# Patient Record
Sex: FEMALE | Race: WHITE | URBAN - NONMETROPOLITAN AREA
[De-identification: names, ages, dates, MRNs, and addresses within clinical notes are randomized per-mention and may not be internally consistent; named-entity substitution may affect disease eponyms.]

---

## 2023-04-05 ENCOUNTER — HOSPITAL ENCOUNTER (EMERGENCY)
Age: 58
Discharge: HOME | End: 2023-04-05
Attending: EMERGENCY MEDICINE
Payer: MEDICARE

## 2023-04-05 VITALS
DIASTOLIC BLOOD PRESSURE: 85 MMHG | TEMPERATURE: 97 F | OXYGEN SATURATION: 99 % | SYSTOLIC BLOOD PRESSURE: 136 MMHG | WEIGHT: 100 LBS | RESPIRATION RATE: 16 BRPM | HEART RATE: 80 BPM

## 2023-04-05 DIAGNOSIS — R19.7 DIARRHEA, UNSPECIFIED TYPE: Primary | ICD-10-CM

## 2023-04-05 LAB
ABSOLUTE EOS #: 0.06 K/UL (ref 0–0.44)
ABSOLUTE IMMATURE GRANULOCYTE: 0.03 K/UL (ref 0–0.3)
ABSOLUTE LYMPH #: 2.64 K/UL (ref 1.1–3.7)
ABSOLUTE MONO #: 0.49 K/UL (ref 0.1–1.2)
ALBUMIN SERPL-MCNC: 4.3 G/DL (ref 3.5–5.2)
ALBUMIN/GLOBULIN RATIO: 1.2 (ref 1–2.5)
ALP SERPL-CCNC: 77 U/L (ref 35–104)
ALT SERPL-CCNC: 11 U/L (ref 5–33)
ANION GAP SERPL CALCULATED.3IONS-SCNC: 10 MMOL/L (ref 9–17)
AST SERPL-CCNC: 16 U/L
BASOPHILS # BLD: 1 % (ref 0–2)
BASOPHILS ABSOLUTE: 0.05 K/UL (ref 0–0.2)
BILIRUB SERPL-MCNC: 0.4 MG/DL (ref 0.3–1.2)
BUN SERPL-MCNC: 10 MG/DL (ref 6–20)
BUN/CREAT BLD: 15 (ref 9–20)
CALCIUM SERPL-MCNC: 9.4 MG/DL (ref 8.6–10.4)
CHLORIDE SERPL-SCNC: 105 MMOL/L (ref 98–107)
CO2 SERPL-SCNC: 25 MMOL/L (ref 20–31)
CREAT SERPL-MCNC: 0.65 MG/DL (ref 0.5–0.9)
EOSINOPHILS RELATIVE PERCENT: 1 % (ref 1–4)
GFR SERPL CREATININE-BSD FRML MDRD: >60 ML/MIN/1.73M2
GLUCOSE SERPL-MCNC: 91 MG/DL (ref 70–99)
HCT VFR BLD AUTO: 44.5 % (ref 36.3–47.1)
HGB BLD-MCNC: 14.9 G/DL (ref 11.9–15.1)
IMMATURE GRANULOCYTES: 0 %
LIPASE SERPL-CCNC: 41 U/L (ref 13–60)
LYMPHOCYTES # BLD: 28 % (ref 24–43)
MCH RBC QN AUTO: 30 PG (ref 25.2–33.5)
MCHC RBC AUTO-ENTMCNC: 33.5 G/DL (ref 28.4–34.8)
MCV RBC AUTO: 89.5 FL (ref 82.6–102.9)
MONOCYTES # BLD: 5 % (ref 3–12)
NRBC AUTOMATED: 0 PER 100 WBC
PDW BLD-RTO: 13.4 % (ref 11.8–14.4)
PLATELET # BLD AUTO: 301 K/UL (ref 138–453)
PMV BLD AUTO: 9.9 FL (ref 8.1–13.5)
POTASSIUM SERPL-SCNC: 3.7 MMOL/L (ref 3.7–5.3)
PROT SERPL-MCNC: 7.8 G/DL (ref 6.4–8.3)
RBC # BLD: 4.97 M/UL (ref 3.95–5.11)
SEG NEUTROPHILS: 65 % (ref 36–65)
SEGMENTED NEUTROPHILS ABSOLUTE COUNT: 6.01 K/UL (ref 1.5–8.1)
SODIUM SERPL-SCNC: 140 MMOL/L (ref 135–144)
WBC # BLD AUTO: 9.3 K/UL (ref 3.5–11.3)

## 2023-04-05 PROCEDURE — 85025 COMPLETE CBC W/AUTO DIFF WBC: CPT

## 2023-04-05 PROCEDURE — 36415 COLL VENOUS BLD VENIPUNCTURE: CPT

## 2023-04-05 PROCEDURE — 80053 COMPREHEN METABOLIC PANEL: CPT

## 2023-04-05 PROCEDURE — 83690 ASSAY OF LIPASE: CPT

## 2023-04-05 ASSESSMENT — PAIN - FUNCTIONAL ASSESSMENT: PAIN_FUNCTIONAL_ASSESSMENT: NONE - DENIES PAIN

## 2023-04-05 ASSESSMENT — ENCOUNTER SYMPTOMS
SORE THROAT: 0
ABDOMINAL DISTENTION: 0
DIARRHEA: 1
SHORTNESS OF BREATH: 0
BACK PAIN: 0

## 2023-04-05 NOTE — ED PROVIDER NOTES
Pulmonary:      Effort: Pulmonary effort is normal.      Breath sounds: Normal breath sounds. Abdominal:      General: Abdomen is flat. Bowel sounds are normal.      Palpations: Abdomen is soft. Musculoskeletal:         General: Normal range of motion. Skin:     General: Skin is warm and dry. Capillary Refill: Capillary refill takes less than 2 seconds. Neurological:      General: No focal deficit present. Mental Status: She is alert and oriented to person, place, and time. Psychiatric:         Mood and Affect: Mood normal.       DIAGNOSTIC RESULTS     EKG: All EKG's are interpreted by the Emergency Department Physician who either signs or Co-signs this chart in the absence of a cardiologist.      RADIOLOGY:   Non-plain film images such as CT, Ultrasound and MRI are read by the radiologist. Plain radiographic images are visualized and preliminarily interpreted by the emergency physician with the below findings:      Interpretation per the Radiologist below, if available at the time of this note:    No orders to display         ED BEDSIDE ULTRASOUND:   Performed by ED Physician - none    LABS:  Labs Reviewed   CBC WITH AUTO DIFFERENTIAL   COMPREHENSIVE METABOLIC PANEL   LIPASE   URINALYSIS WITH MICROSCOPIC       All other labs were within normal range or not returned as of this dictation. EMERGENCY DEPARTMENT COURSE and DIFFERENTIAL DIAGNOSIS/MDM:   Vitals:    Vitals:    04/05/23 1024 04/05/23 1033   BP: (!) 152/113 136/85   Pulse: 72 80   Resp: 16 16   Temp: 98.1 °F (36.7 °C) 97 °F (36.1 °C)   TempSrc: Oral Tympanic   SpO2: 100% 99%   Weight:  100 lb (45.4 kg)           REASSESSMENT        Reassured patient that at this time she does not have any symptoms and her vital signs are completely stable. She does not have any abdominal pain. She has only had 5 episodes of encouraged her to follow-up with her primary care doctor so she can get a referral to GI doctor in case she does have IBS.

## 2023-04-05 NOTE — DISCHARGE INSTRUCTIONS
Please make sure to continue drinking plenty of fluids. Follow-up with your family doctor so you can get a referral to gastroenterology for possible outpatient colonoscopy if your symptoms continue. Otherwise you can take Imodium to help with the diarrhea as needed. Watch for signs of blood in the stool or if the stools are black in color to return to the emergency department. If you have any concerns or questions regarding your care today, please discuss with your nurse or physician prior to leaving the emergency department. Thank you for allowing us to take care of you at [facility]..  In the next few days you may receive a survey by mail or e-mail asking about the care you received during this visit. Please complete this if you are able, as this feedback helps us provide the best care possible.

## 2023-05-10 ENCOUNTER — HOSPITAL ENCOUNTER (EMERGENCY)
Age: 58
Discharge: HOME | End: 2023-05-10
Payer: MEDICARE

## 2023-05-10 ENCOUNTER — APPOINTMENT (OUTPATIENT)
Dept: CT IMAGING | Age: 58
End: 2023-05-10
Payer: MEDICARE

## 2023-05-10 VITALS
WEIGHT: 99 LBS | HEART RATE: 93 BPM | OXYGEN SATURATION: 98 % | SYSTOLIC BLOOD PRESSURE: 107 MMHG | DIASTOLIC BLOOD PRESSURE: 63 MMHG | TEMPERATURE: 98.8 F | RESPIRATION RATE: 16 BRPM

## 2023-05-10 DIAGNOSIS — N34.2 INFECTIVE URETHRITIS: ICD-10-CM

## 2023-05-10 DIAGNOSIS — R10.9 ABDOMINAL PAIN, UNSPECIFIED ABDOMINAL LOCATION: Primary | ICD-10-CM

## 2023-05-10 LAB
ABSOLUTE EOS #: 0.03 K/UL (ref 0–0.44)
ABSOLUTE IMMATURE GRANULOCYTE: <0.03 K/UL (ref 0–0.3)
ABSOLUTE LYMPH #: 2.89 K/UL (ref 1.1–3.7)
ABSOLUTE MONO #: 0.77 K/UL (ref 0.1–1.2)
ALBUMIN SERPL-MCNC: 4.5 G/DL (ref 3.5–5.2)
ALBUMIN/GLOBULIN RATIO: 1.4 (ref 1–2.5)
ALP SERPL-CCNC: 81 U/L (ref 35–104)
ALT SERPL-CCNC: 12 U/L (ref 5–33)
ANION GAP SERPL CALCULATED.3IONS-SCNC: 12 MMOL/L (ref 9–17)
AST SERPL-CCNC: 15 U/L
BACTERIA: ABNORMAL
BASOPHILS # BLD: 1 % (ref 0–2)
BASOPHILS ABSOLUTE: 0.04 K/UL (ref 0–0.2)
BILIRUB SERPL-MCNC: 0.2 MG/DL (ref 0.3–1.2)
BILIRUBIN URINE: NEGATIVE
BUN SERPL-MCNC: 13 MG/DL (ref 6–20)
BUN/CREAT BLD: 20 (ref 9–20)
CALCIUM SERPL-MCNC: 10 MG/DL (ref 8.6–10.4)
CHLORIDE SERPL-SCNC: 103 MMOL/L (ref 98–107)
CO2 SERPL-SCNC: 27 MMOL/L (ref 20–31)
COLOR: YELLOW
CREAT SERPL-MCNC: 0.65 MG/DL (ref 0.5–0.9)
EOSINOPHILS RELATIVE PERCENT: 0 % (ref 1–4)
EPITHELIAL CELLS UA: ABNORMAL /HPF (ref 0–25)
GFR SERPL CREATININE-BSD FRML MDRD: >60 ML/MIN/1.73M2
GLUCOSE SERPL-MCNC: 107 MG/DL (ref 70–99)
GLUCOSE UR STRIP.AUTO-MCNC: NEGATIVE MG/DL
HCT VFR BLD AUTO: 46.8 % (ref 36.3–47.1)
HGB BLD-MCNC: 15.1 G/DL (ref 11.9–15.1)
IMMATURE GRANULOCYTES: 0 %
KETONES UR STRIP.AUTO-MCNC: NEGATIVE MG/DL
LACTATE PLASV-SCNC: 1.7 MMOL/L (ref 0.5–2.2)
LEUKOCYTE ESTERASE UR QL STRIP.AUTO: ABNORMAL
LIPASE SERPL-CCNC: 44 U/L (ref 13–60)
LYMPHOCYTES # BLD: 33 % (ref 24–43)
MCH RBC QN AUTO: 29.5 PG (ref 25.2–33.5)
MCHC RBC AUTO-ENTMCNC: 32.3 G/DL (ref 28.4–34.8)
MCV RBC AUTO: 91.4 FL (ref 82.6–102.9)
MONOCYTES # BLD: 9 % (ref 3–12)
NITRITE UR QL STRIP.AUTO: POSITIVE
NRBC AUTOMATED: 0 PER 100 WBC
PDW BLD-RTO: 13.5 % (ref 11.8–14.4)
PLATELET # BLD AUTO: 326 K/UL (ref 138–453)
PMV BLD AUTO: 9.9 FL (ref 8.1–13.5)
POTASSIUM SERPL-SCNC: 3.8 MMOL/L (ref 3.7–5.3)
PROT SERPL-MCNC: 7.7 G/DL (ref 6.4–8.3)
PROT UR STRIP.AUTO-MCNC: 6 MG/DL (ref 5–9)
PROT UR STRIP.AUTO-MCNC: NEGATIVE MG/DL
RBC # BLD: 5.12 M/UL (ref 3.95–5.11)
RBC CLUMPS #/AREA URNS AUTO: ABNORMAL /HPF (ref 0–2)
SEG NEUTROPHILS: 57 % (ref 36–65)
SEGMENTED NEUTROPHILS ABSOLUTE COUNT: 4.93 K/UL (ref 1.5–8.1)
SODIUM SERPL-SCNC: 142 MMOL/L (ref 135–144)
SPECIFIC GRAVITY UA: 1.01 (ref 1.01–1.02)
TURBIDITY: ABNORMAL
URINE HGB: ABNORMAL
UROBILINOGEN, URINE: NORMAL
WBC # BLD AUTO: 8.7 K/UL (ref 3.5–11.3)
WBC UA: ABNORMAL /HPF (ref 0–5)

## 2023-05-10 PROCEDURE — 6360000004 HC RX CONTRAST MEDICATION: Performed by: PHYSICIAN ASSISTANT

## 2023-05-10 PROCEDURE — 99285 EMERGENCY DEPT VISIT HI MDM: CPT

## 2023-05-10 PROCEDURE — 83690 ASSAY OF LIPASE: CPT

## 2023-05-10 PROCEDURE — 86403 PARTICLE AGGLUT ANTBDY SCRN: CPT

## 2023-05-10 PROCEDURE — 87086 URINE CULTURE/COLONY COUNT: CPT

## 2023-05-10 PROCEDURE — 96365 THER/PROPH/DIAG IV INF INIT: CPT

## 2023-05-10 PROCEDURE — 87077 CULTURE AEROBIC IDENTIFY: CPT

## 2023-05-10 PROCEDURE — 36415 COLL VENOUS BLD VENIPUNCTURE: CPT

## 2023-05-10 PROCEDURE — 74177 CT ABD & PELVIS W/CONTRAST: CPT

## 2023-05-10 PROCEDURE — 87186 SC STD MICRODIL/AGAR DIL: CPT

## 2023-05-10 PROCEDURE — 6360000002 HC RX W HCPCS: Performed by: PHYSICIAN ASSISTANT

## 2023-05-10 PROCEDURE — 80053 COMPREHEN METABOLIC PANEL: CPT

## 2023-05-10 PROCEDURE — 83605 ASSAY OF LACTIC ACID: CPT

## 2023-05-10 PROCEDURE — 81001 URINALYSIS AUTO W/SCOPE: CPT

## 2023-05-10 PROCEDURE — 2580000003 HC RX 258: Performed by: PHYSICIAN ASSISTANT

## 2023-05-10 PROCEDURE — 85025 COMPLETE CBC W/AUTO DIFF WBC: CPT

## 2023-05-10 RX ORDER — 0.9 % SODIUM CHLORIDE 0.9 %
1000 INTRAVENOUS SOLUTION INTRAVENOUS ONCE
Status: COMPLETED | OUTPATIENT
Start: 2023-05-10 | End: 2023-05-10

## 2023-05-10 RX ORDER — CEPHALEXIN 500 MG/1
500 CAPSULE ORAL 4 TIMES DAILY
Qty: 40 CAPSULE | Refills: 0 | Status: SHIPPED | OUTPATIENT
Start: 2023-05-10 | End: 2023-05-20

## 2023-05-10 RX ADMIN — CEFTRIAXONE 1000 MG: 1 INJECTION, POWDER, FOR SOLUTION INTRAMUSCULAR; INTRAVENOUS at 18:01

## 2023-05-10 RX ADMIN — SODIUM CHLORIDE 1000 ML: 9 INJECTION, SOLUTION INTRAVENOUS at 17:57

## 2023-05-10 RX ADMIN — IOPAMIDOL 75 ML: 755 INJECTION, SOLUTION INTRAVENOUS at 16:37

## 2023-05-10 ASSESSMENT — PAIN - FUNCTIONAL ASSESSMENT: PAIN_FUNCTIONAL_ASSESSMENT: NONE - DENIES PAIN

## 2023-05-10 ASSESSMENT — ENCOUNTER SYMPTOMS: RESPIRATORY NEGATIVE: 1

## 2023-05-10 NOTE — DISCHARGE INSTRUCTIONS
Follow-up with colonoscopy appointment as scheduled. Also, follow-up with primary care doctor 7 to 10 days for reevaluation. Start antibiotics tomorrow as directed as you have already been given a dose here in the emergency department. Promptly return to emergency department for new, changing, worsening of symptoms or other concerns.

## 2023-05-10 NOTE — ED PROVIDER NOTES
[facility] ED  EMERGENCY DEPARTMENT ENCOUNTER      Pt Name: Tho Arndt  MRN: [id number]  [place] 1965  Date of evaluation: 5/10/2023  Provider: Mary Kay Casanova PA-C    CHIEF COMPLAINT       Chief Complaint   Patient presents with    Abdominal Pain     Intermittent over the last couple of months, also reports different colored stools, has been seen for same complaint in the ED and by PCP, has appointment with GI doctor on          HISTORY OF PRESENT ILLNESS   (Location/Symptom, Timing/Onset, Context/Setting, Quality, Duration, Modifying Factors, Severity)  Note limiting factors. Tho Arndt is a 63 y.o. female who presents to the emergency department for evaluation of intermittent abdominal pain since March, scheduled for colonoscopy 16 May at [facility]. Patient reports last 2 days she has noticed \"dark\" stools. Patient denies daily aspirin or anticoagulative therapy. Patient reports intermittent diarrhea and denies recent antibiotic use. Patient denies history of fever chest pain acute shortness of breath dizziness nausea vomiting nasreen blood from below bleeding hemorrhoids or urinary symptoms. No other symptoms noted, patient has no additional complaints at present. HPI    Nursing Notes were reviewed. REVIEW OF SYSTEMS    (2-9 systems for level 4, 10 or more for level 5)     Review of Systems   Constitutional:  Positive for unexpected weight change. See hpi   Respiratory: Negative. Cardiovascular: Negative. Gastrointestinal:         See hpi   Genitourinary: Negative. Musculoskeletal: Negative. Skin: Negative. Psychiatric/Behavioral: Negative. Except as noted above the remainder of the review of systems was reviewed and negative.        PAST MEDICAL HISTORY     Past Medical History:   Diagnosis Date    CP (cerebral palsy) ([facility] [address]          SURGICAL HISTORY       Past Surgical History:   Procedure Laterality Date     SECTION      LEG

## 2023-05-12 LAB
MICROORGANISM SPEC CULT: ABNORMAL
MICROORGANISM SPEC CULT: ABNORMAL
SPECIMEN DESCRIPTION: ABNORMAL

## 2023-08-07 ENCOUNTER — HOSPITAL ENCOUNTER (OUTPATIENT)
Age: 58
Setting detail: SPECIMEN
Discharge: HOME | End: 2023-08-07

## 2023-08-09 LAB — H PYLORI AG STL QL IA: NEGATIVE

## 2023-09-07 ENCOUNTER — TELEPHONE (OUTPATIENT)
Dept: GASTROENTEROLOGY | Age: 58
End: 2023-09-07

## 2023-10-03 ENCOUNTER — HOSPITAL ENCOUNTER (OUTPATIENT)
Age: 58
Setting detail: SPECIMEN
Discharge: HOME | End: 2023-10-03
Payer: MEDICARE

## 2023-10-03 ENCOUNTER — OFFICE VISIT (OUTPATIENT)
Dept: UROLOGY | Age: 58
End: 2023-10-03

## 2023-10-03 VITALS — WEIGHT: 90 LBS | TEMPERATURE: 98 F | SYSTOLIC BLOOD PRESSURE: 110 MMHG | DIASTOLIC BLOOD PRESSURE: 78 MMHG

## 2023-10-03 DIAGNOSIS — N39.46 MIXED INCONTINENCE: Primary | ICD-10-CM

## 2023-10-03 DIAGNOSIS — R35.0 FREQUENCY OF URINATION: ICD-10-CM

## 2023-10-03 DIAGNOSIS — Z86.19 HISTORY OF HELICOBACTER PYLORI INFECTION: ICD-10-CM

## 2023-10-03 DIAGNOSIS — N39.46 MIXED INCONTINENCE: ICD-10-CM

## 2023-10-03 DIAGNOSIS — R63.4 WEIGHT LOSS: ICD-10-CM

## 2023-10-03 DIAGNOSIS — Z01.419 WELL WOMAN EXAM: ICD-10-CM

## 2023-10-03 LAB
BACTERIA URNS QL MICRO: ABNORMAL
BILIRUB UR QL STRIP: NEGATIVE
CLARITY UR: CLEAR
COLOR UR: YELLOW
EPI CELLS #/AREA URNS HPF: ABNORMAL /HPF (ref 0–25)
GLUCOSE UR STRIP-MCNC: NEGATIVE MG/DL
HGB UR QL STRIP.AUTO: ABNORMAL
KETONES UR STRIP-MCNC: NEGATIVE MG/DL
LEUKOCYTE ESTERASE UR QL STRIP: ABNORMAL
NITRITE UR QL STRIP: POSITIVE
PH UR STRIP: 6 [PH] (ref 5–9)
PROT UR STRIP-MCNC: NEGATIVE MG/DL
RBC #/AREA URNS HPF: ABNORMAL /HPF (ref 0–2)
SP GR UR STRIP: 1.01 (ref 1.01–1.02)
UROBILINOGEN UR STRIP-ACNC: NORMAL EU/DL (ref 0–1)
WBC #/AREA URNS HPF: ABNORMAL /HPF (ref 0–5)

## 2023-10-03 PROCEDURE — 81001 URINALYSIS AUTO W/SCOPE: CPT

## 2023-10-03 PROCEDURE — 87086 URINE CULTURE/COLONY COUNT: CPT

## 2023-10-03 PROCEDURE — 86403 PARTICLE AGGLUT ANTBDY SCRN: CPT

## 2023-10-03 RX ORDER — OMEPRAZOLE 20 MG/1
CAPSULE, DELAYED RELEASE ORAL
COMMUNITY
Start: 2023-09-05

## 2023-10-03 NOTE — PATIENT INSTRUCTIONS
Drink 64-80 ounces of water every day    Yogurt with live and active cultures (job's yogurt)    Take probiotic daily, make sure it has billions of bacteria    SURVEY:    You may be receiving a survey from [employer] regarding your visit today. Please complete the survey to enable us to provide the highest quality of care to you and your family. If you cannot score us a very good on any question, please call the office to discuss how we could have made your experience a very good one. Thank you.

## 2023-10-03 NOTE — PROGRESS NOTES
HPI:        Patient is a 63 y.o. female in no acute distress. She is alert and oriented to person, place, and time. Cerebral palsy    New patient referral for incontinence. She developed urinary continence approximately 4 months ago after a bout of H. pylori. She also complains of urinary frequency and urgency. Prior to getting h.pylori she did have some urgency and rare UUI. She has fecal urgency, but denies fecal incontinence. She has nocturia 1-2 times per night, but she is already wet by the time she gets there. She is using 4 briefs per day with 6 pads per day. PVR is 140ml. She is having a soft daily bowel movement. She does pass urine and stool at the same time every time she goes to the bathroom. She denies any dysuria or gross hematuria. She only drinks water. In 2023 urine culture was positive for Klebsiella and group beta strep. She also had a CT in May. This film was independently reviewed and showed no evidence of  abnormalities. She has never seen urology in the past.      Past Medical History:   Diagnosis Date    CP (cerebral palsy) ([address])      Past Surgical History:   Procedure Laterality Date     SECTION      LEG SURGERY       Outpatient Encounter Medications as of 10/3/2023   Medication Sig Dispense Refill    omeprazole (PRILOSEC) 20 MG delayed release capsule TAKE 1 CAPSULE BY MOUTH ONCE DAILY FOR EIGHT WEEKS      acetaminophen (TYLENOL) 500 MG tablet Take 2 tablets by mouth every 6 hours as needed for Pain       No facility-administered encounter medications on file as of 10/3/2023. Current Outpatient Medications on File Prior to Visit   Medication Sig Dispense Refill    omeprazole (PRILOSEC) 20 MG delayed release capsule TAKE 1 CAPSULE BY MOUTH ONCE DAILY FOR EIGHT WEEKS      acetaminophen (TYLENOL) 500 MG tablet Take 2 tablets by mouth every 6 hours as needed for Pain       No current facility-administered medications on file prior to visit.      Patient has

## 2023-10-04 LAB
MICROORGANISM SPEC CULT: ABNORMAL
SPECIMEN DESCRIPTION: ABNORMAL

## 2023-10-05 ENCOUNTER — TELEPHONE (OUTPATIENT)
Dept: UROLOGY | Age: 58
End: 2023-10-05

## 2023-10-05 RX ORDER — CEPHALEXIN 500 MG/1
500 CAPSULE ORAL 3 TIMES DAILY
Qty: 21 CAPSULE | Refills: 0 | Status: SHIPPED | OUTPATIENT
Start: 2023-10-05 | End: 2023-10-12

## 2023-10-05 ASSESSMENT — ENCOUNTER SYMPTOMS
COLOR CHANGE: 0
COUGH: 0
EYE REDNESS: 0
NAUSEA: 0
VOMITING: 0
SHORTNESS OF BREATH: 0
CONSTIPATION: 0
APNEA: 0
ABDOMINAL PAIN: 0
BACK PAIN: 0
WHEEZING: 0

## 2023-10-05 NOTE — TELEPHONE ENCOUNTER
Patient informed of results and verbalizes understanding. Patient uses the [facility] on [address] [facility].

## 2023-10-05 NOTE — TELEPHONE ENCOUNTER
Urine culture is positive for infection. What local pharmacy does she use? Take keflex TID. Take this antibiotic until gone. Take this with food and eat yogurt once per day to prevent GI upset. If you develop nausea, vomiting, or fevers call the office or go to the ER. If your urinary symptoms do not improve once completing the antibiotics call our office.

## 2023-10-11 NOTE — PROGRESS NOTES
management.       Return in about 1 month (around 11/12/2023) for gyn u/s - no transvaginal .       Electronically Signed by TOMER Wyman CNM

## 2023-10-12 ENCOUNTER — OFFICE VISIT (OUTPATIENT)
Dept: OBGYN | Age: 58
End: 2023-10-12

## 2023-10-12 ENCOUNTER — HOSPITAL ENCOUNTER (OUTPATIENT)
Age: 58
Discharge: HOME | End: 2023-10-12
Payer: MEDICARE

## 2023-10-12 VITALS
BODY MASS INDEX: 18.14 KG/M2 | SYSTOLIC BLOOD PRESSURE: 110 MMHG | WEIGHT: 90 LBS | HEIGHT: 59 IN | DIASTOLIC BLOOD PRESSURE: 72 MMHG

## 2023-10-12 DIAGNOSIS — N94.10 DYSPAREUNIA, FEMALE: ICD-10-CM

## 2023-10-12 DIAGNOSIS — Z01.419 WELL WOMAN EXAM: Primary | ICD-10-CM

## 2023-10-12 DIAGNOSIS — Z12.4 CERVICAL CANCER SCREENING: ICD-10-CM

## 2023-10-12 DIAGNOSIS — R10.2 PELVIC PAIN: ICD-10-CM

## 2023-10-12 DIAGNOSIS — Z01.419 WELL WOMAN EXAM: ICD-10-CM

## 2023-10-12 DIAGNOSIS — Z11.3 ROUTINE SCREENING FOR STI (SEXUALLY TRANSMITTED INFECTION): ICD-10-CM

## 2023-10-12 DIAGNOSIS — R32 BOWEL AND BLADDER INCONTINENCE: ICD-10-CM

## 2023-10-12 DIAGNOSIS — N95.2 VAGINAL ATROPHY: ICD-10-CM

## 2023-10-12 DIAGNOSIS — R15.9 BOWEL AND BLADDER INCONTINENCE: ICD-10-CM

## 2023-10-12 LAB
CANDIDA SPECIES: NEGATIVE
GARDNERELLA VAGINALIS: NEGATIVE
HCV AB SERPL QL IA: NONREACTIVE
HIV 1+2 AB+HIV1 P24 AG SERPL QL IA: NONREACTIVE
SOURCE: NORMAL
T PALLIDUM AB SER QL IA: NONREACTIVE
TRICHOMONAS: NEGATIVE

## 2023-10-12 PROCEDURE — 87591 N.GONORRHOEAE DNA AMP PROB: CPT

## 2023-10-12 PROCEDURE — 87660 TRICHOMONAS VAGIN DIR PROBE: CPT

## 2023-10-12 PROCEDURE — 87480 CANDIDA DNA DIR PROBE: CPT

## 2023-10-12 PROCEDURE — 87491 CHLMYD TRACH DNA AMP PROBE: CPT

## 2023-10-12 PROCEDURE — 87510 GARDNER VAG DNA DIR PROBE: CPT

## 2023-10-12 PROCEDURE — 36415 COLL VENOUS BLD VENIPUNCTURE: CPT

## 2023-10-12 PROCEDURE — G0145 SCR C/V CYTO,THINLAYER,RESCR: HCPCS

## 2023-10-12 PROCEDURE — 86780 TREPONEMA PALLIDUM: CPT

## 2023-10-12 PROCEDURE — 86803 HEPATITIS C AB TEST: CPT

## 2023-10-12 PROCEDURE — 87624 HPV HI-RISK TYP POOLED RSLT: CPT

## 2023-10-12 PROCEDURE — 87389 HIV-1 AG W/HIV-1&-2 AB AG IA: CPT

## 2023-10-12 RX ORDER — ESTRADIOL 0.1 MG/G
2 CREAM VAGINAL DAILY
Qty: 42.5 G | Refills: 1 | Status: SHIPPED | OUTPATIENT
Start: 2023-10-12

## 2023-10-12 SDOH — ECONOMIC STABILITY: FOOD INSECURITY: WITHIN THE PAST 12 MONTHS, YOU WORRIED THAT YOUR FOOD WOULD RUN OUT BEFORE YOU GOT MONEY TO BUY MORE.: NEVER TRUE

## 2023-10-12 SDOH — ECONOMIC STABILITY: FOOD INSECURITY: WITHIN THE PAST 12 MONTHS, THE FOOD YOU BOUGHT JUST DIDN'T LAST AND YOU DIDN'T HAVE MONEY TO GET MORE.: NEVER TRUE

## 2023-10-12 SDOH — ECONOMIC STABILITY: HOUSING INSECURITY
IN THE LAST 12 MONTHS, WAS THERE A TIME WHEN YOU DID NOT HAVE A STEADY PLACE TO SLEEP OR SLEPT IN A SHELTER (INCLUDING NOW)?: NO

## 2023-10-12 SDOH — ECONOMIC STABILITY: INCOME INSECURITY: HOW HARD IS IT FOR YOU TO PAY FOR THE VERY BASICS LIKE FOOD, HOUSING, MEDICAL CARE, AND HEATING?: NOT HARD AT ALL

## 2023-10-12 ASSESSMENT — PATIENT HEALTH QUESTIONNAIRE - PHQ9
SUM OF ALL RESPONSES TO PHQ9 QUESTIONS 1 & 2: 2
SUM OF ALL RESPONSES TO PHQ QUESTIONS 1-9: 2
2. FEELING DOWN, DEPRESSED OR HOPELESS: 2
1. LITTLE INTEREST OR PLEASURE IN DOING THINGS: 0
SUM OF ALL RESPONSES TO PHQ QUESTIONS 1-9: 2

## 2023-10-12 ASSESSMENT — ENCOUNTER SYMPTOMS
COUGH: 0
NAUSEA: 0
VOMITING: 0
CHEST TIGHTNESS: 0
SHORTNESS OF BREATH: 0
WHEEZING: 0
ABDOMINAL DISTENTION: 0
DIARRHEA: 0
ABDOMINAL PAIN: 0
CONSTIPATION: 0
COLOR CHANGE: 0

## 2023-10-13 LAB
C TRACH DNA SPEC QL PROBE+SIG AMP: NEGATIVE
N GONORRHOEA DNA SPEC QL PROBE+SIG AMP: NEGATIVE
SPECIMEN DESCRIPTION: NORMAL

## 2023-10-17 LAB
HPV I/H RISK 4 DNA CVX QL NAA+PROBE: NOT DETECTED
HPV SAMPLE: NORMAL
HPV, INTERPRETATION: NORMAL
HPV16 DNA CVX QL NAA+PROBE: NOT DETECTED
HPV18 DNA CVX QL NAA+PROBE: NOT DETECTED
SPECIMEN DESCRIPTION: NORMAL

## 2023-10-21 LAB — CYTOLOGY REPORT: NORMAL

## 2023-10-23 ENCOUNTER — OFFICE VISIT (OUTPATIENT)
Dept: UROLOGY | Age: 58
End: 2023-10-23
Payer: MEDICARE

## 2023-10-23 VITALS
SYSTOLIC BLOOD PRESSURE: 103 MMHG | TEMPERATURE: 97.3 F | DIASTOLIC BLOOD PRESSURE: 62 MMHG | BODY MASS INDEX: 18.99 KG/M2 | WEIGHT: 94 LBS | HEART RATE: 85 BPM

## 2023-10-23 DIAGNOSIS — R35.0 FREQUENCY OF URINATION: ICD-10-CM

## 2023-10-23 DIAGNOSIS — N39.46 MIXED INCONTINENCE: Primary | ICD-10-CM

## 2023-10-23 DIAGNOSIS — N95.2 VAGINAL ATROPHY: ICD-10-CM

## 2023-10-23 PROCEDURE — 99214 OFFICE O/P EST MOD 30 MIN: CPT | Performed by: UROLOGY

## 2023-10-23 RX ORDER — OXYBUTYNIN CHLORIDE 5 MG/1
5 TABLET, EXTENDED RELEASE ORAL DAILY
Qty: 30 TABLET | Refills: 3 | Status: SHIPPED | OUTPATIENT
Start: 2023-10-23

## 2023-10-23 ASSESSMENT — ENCOUNTER SYMPTOMS
NAUSEA: 0
CONSTIPATION: 0
COUGH: 0
WHEEZING: 0
VOMITING: 0
BACK PAIN: 0
COLOR CHANGE: 0
SHORTNESS OF BREATH: 0
EYE REDNESS: 0
APNEA: 0
ABDOMINAL PAIN: 0

## 2023-10-23 NOTE — PROGRESS NOTES
Encounter Medications as of 10/23/2023   Medication Sig Dispense Refill    estradiol (ESTRACE VAGINAL) 0.1 MG/GM vaginal cream Place 2 g vaginally daily Place 2 g vaginally daily for two weeks then use twice weekly. 42.5 g 1    omeprazole (PRILOSEC) 20 MG delayed release capsule TAKE 1 CAPSULE BY MOUTH ONCE DAILY FOR EIGHT WEEKS      acetaminophen (TYLENOL) 500 MG tablet Take 2 tablets by mouth every 6 hours as needed for Pain       No facility-administered encounter medications on file as of 10/23/2023. Current Outpatient Medications on File Prior to Visit   Medication Sig Dispense Refill    estradiol (ESTRACE VAGINAL) 0.1 MG/GM vaginal cream Place 2 g vaginally daily Place 2 g vaginally daily for two weeks then use twice weekly. 42.5 g 1    omeprazole (PRILOSEC) 20 MG delayed release capsule TAKE 1 CAPSULE BY MOUTH ONCE DAILY FOR EIGHT WEEKS      acetaminophen (TYLENOL) 500 MG tablet Take 2 tablets by mouth every 6 hours as needed for Pain       No current facility-administered medications on file prior to visit. Patient has no known allergies. Family History   Problem Relation Age of Onset    Breast Cancer Neg Hx     Ovarian Cancer Neg Hx     Stroke Neg Hx      Social History     Tobacco Use   Smoking Status Never   Smokeless Tobacco Never       Social History     Substance and Sexual Activity   Alcohol Use Yes    Comment: light, socially       Review of Systems   Constitutional:  Negative for appetite change, chills and fever. Eyes:  Negative for redness and visual disturbance. Respiratory:  Negative for apnea, cough, shortness of breath and wheezing. Cardiovascular:  Negative for chest pain and leg swelling. Gastrointestinal:  Negative for abdominal pain, constipation, nausea and vomiting. Genitourinary:  Positive for frequency and urgency. Negative for difficulty urinating, dyspareunia, dysuria, enuresis, flank pain, hematuria, pelvic pain, vaginal bleeding and vaginal discharge.

## 2023-10-23 NOTE — PATIENT INSTRUCTIONS
SURVEY:    You may be receiving a survey from [employer] regarding your visit today. Please complete the survey to enable us to provide the highest quality of care to you and your family. If you cannot score us a very good on any question, please call the office to discuss how we could have made your experience a very good one. Thank you.

## 2023-10-26 ENCOUNTER — OFFICE VISIT (OUTPATIENT)
Dept: GASTROENTEROLOGY | Age: 58
End: 2023-10-26

## 2023-10-26 VITALS
DIASTOLIC BLOOD PRESSURE: 66 MMHG | RESPIRATION RATE: 18 BRPM | SYSTOLIC BLOOD PRESSURE: 99 MMHG | BODY MASS INDEX: 18.95 KG/M2 | WEIGHT: 94 LBS | OXYGEN SATURATION: 98 % | HEART RATE: 80 BPM | HEIGHT: 59 IN

## 2023-10-26 DIAGNOSIS — Z86.19 HISTORY OF HELICOBACTER PYLORI INFECTION: ICD-10-CM

## 2023-10-26 DIAGNOSIS — R15.9 INCONTINENCE OF FECES, UNSPECIFIED FECAL INCONTINENCE TYPE: Primary | ICD-10-CM

## 2023-10-26 DIAGNOSIS — Z80.0 FAMILY HISTORY OF COLON CANCER IN FATHER: ICD-10-CM

## 2023-10-26 ASSESSMENT — ENCOUNTER SYMPTOMS
ABDOMINAL PAIN: 0
BLOOD IN STOOL: 0
CONSTIPATION: 0
TROUBLE SWALLOWING: 0
DIARRHEA: 1
ANAL BLEEDING: 0

## 2023-10-26 NOTE — PROGRESS NOTES
[facility] [address] [facility]    Chief Complaint   Patient presents with    New Patient     Patient is new patient being referred by PCP to discuss issues with IBS-D. Patient is established with GI in [place] but would like to see Gi closer to home. Patient has had colonoscopy in the past (May 2023). Patient reports family history of colon cancer (father), Patient reports daily bowel movements with fecal incontinence issues. Patient reports she did change her diet to a green vegetable diet after her H. Pylori Dx. In may so stools are more of a mireya- green in color. Denies rectal bleeding. MEGAN Cheng is a 63 y.o. old female who has a past medical history of cerebral palsy, H. pylori infection presenting as a new GI referral to establish care with a GI closer to her home with concerns for re infection of H. pylori. According to Cruzito Abernathy, she has been experiencing urinary incontinence as well as fecal incontinence with urination. Cruzito Abernathy is concerned that she may have been reinfected with H. pylori as symptoms seem similar to her previous infection and is requesting retesting today. She reports that she has made dietary changes and is mostly eaten a vegetarian diet. Cruzito Abernathy brings a copy of her last colonoscopy and EGD, she was found to be H. pylori + May 31, 2023 and she reports completing treatment for. Review her EMR indicates that she had an H. pylori stool test on August 7, 2023 which was negative. She has been evaluated by GYN and urology, urology prescribed oxybutynin; Cruzito Abernathy voices that she has not picked up from pharmacy and is unsure if she wants to take it due to the side effects that she has read about. She is also reporting that she has started vaginal estrogen.     Family history of colon cancer: Yes  Blood in stool: No  Unintentional weight loss: Yes: 15# (before her colonoscopy in May 2023)  Abdominal pain: Yes: Sometimes   Prior colonoscopy: Yes  Constipation

## 2023-10-27 ENCOUNTER — HOSPITAL ENCOUNTER (OUTPATIENT)
Dept: NUTRITION | Age: 58
Discharge: HOME | End: 2023-10-27
Payer: MEDICARE

## 2023-10-27 PROCEDURE — 97802 MEDICAL NUTRITION INDIV IN: CPT

## 2023-10-27 NOTE — PROGRESS NOTES
MNT provided for ***    {NUTRITION PROBLEM LIST:49850::\"***\"} related to {NUTRITION ETIOLOGY LIST:86973::\"***\"} as evidenced by {NUTRITION SIGNS SYMPTOMS:21037::\"***\"}    Client data    Ht: ***\" Wt: ***# BMI: *** (***)   Weight changes: *** CBW: *** kg   BMR: *** calories Est. total calorie needs: ~***       Client overall goal for weight is for ***. Current eating pattern is ***. Use of whole grains, whole fruits and vegetables appear *** than recommended quantities. There is an excess of *** per recall. Activity is ***. Client presents for MNT today with ***. Client was educated on carbohydrate counting with the following goals at meals and snacks:  Breakfast: *** grams  Lunch: *** grams  Supper: *** grams  Bedtime Snack: *** grams    Client received information on limiting fat in diet to lessen heart disease risk, with goals of: Total Fat: *** grams  Saturated Fat: *** grams  Trans Fat: 0 grams daily     Discussed and provided literature on:  ***    Client goals:  ***    Expected compliance:  ***  Client appears to be in {desc; stages of change:09663} phase of change. Recommend follow up as needed. RD name and phone number provided. Thank you for the referral.    Electronically signed by Claudia Pallas, RD, LD on 10/27/2023 at 2:03 PM    Education session duration: 60 minutes.

## 2023-10-29 ENCOUNTER — HOSPITAL ENCOUNTER (OUTPATIENT)
Age: 58
Setting detail: SPECIMEN
Discharge: HOME | End: 2023-10-29
Payer: MEDICARE

## 2023-10-29 DIAGNOSIS — Z86.19 HISTORY OF HELICOBACTER PYLORI INFECTION: ICD-10-CM

## 2023-10-29 DIAGNOSIS — R15.9 INCONTINENCE OF FECES, UNSPECIFIED FECAL INCONTINENCE TYPE: ICD-10-CM

## 2023-10-29 PROCEDURE — 87338 HPYLORI STOOL AG IA: CPT

## 2023-10-29 PROCEDURE — 87506 IADNA-DNA/RNA PROBE TQ 6-11: CPT

## 2023-10-29 PROCEDURE — 87329 GIARDIA AG IA: CPT

## 2023-10-29 PROCEDURE — 83520 IMMUNOASSAY QUANT NOS NONAB: CPT

## 2023-10-29 PROCEDURE — 87328 CRYPTOSPORIDIUM AG IA: CPT

## 2023-10-30 ENCOUNTER — TELEPHONE (OUTPATIENT)
Dept: GASTROENTEROLOGY | Age: 58
End: 2023-10-30

## 2023-10-30 LAB
C PARVUM AG STL QL IA: NEGATIVE
CAMPYLOBACTER DNA SPEC NAA+PROBE: NORMAL
ETEC ELTA+ESTB GENES STL QL NAA+PROBE: NORMAL
G LAMBLIA AG STL QL IA: NEGATIVE
MICROORGANISM/AGENT SPEC: NEGATIVE
P SHIGELLOIDES DNA STL QL NAA+PROBE: NORMAL
SALMONELLA DNA SPEC QL NAA+PROBE: NORMAL
SHIGA TOXIN STX GENE SPEC NAA+PROBE: NORMAL
SHIGELLA DNA SPEC QL NAA+PROBE: NORMAL
SOURCE: NORMAL
SPECIMEN DESCRIPTION: NORMAL
V CHOL+PARA RFBL+TRKH+TNAA STL QL NAA+PR: NORMAL
Y ENTERO RECN STL QL NAA+PROBE: NORMAL

## 2023-10-30 NOTE — TELEPHONE ENCOUNTER
Patient wanted to know if you still wanted her on Omeprazole. If so, can you send refill to [address] [place] [address]?

## 2023-11-01 LAB — FECAL PANCREATIC ELASTASE-1: >800 UG/G

## 2023-11-03 ENCOUNTER — TELEPHONE (OUTPATIENT)
Dept: GASTROENTEROLOGY | Age: 58
End: 2023-11-03

## 2023-11-03 NOTE — TELEPHONE ENCOUNTER
----- Message from TOMER Tom CNP sent at 11/1/2023  4:19 PM EDT -----  Please let Estella Phalen know her labs returned normal and no H.pylori.

## 2023-11-16 ENCOUNTER — OFFICE VISIT (OUTPATIENT)
Dept: OBGYN | Age: 58
End: 2023-11-16
Payer: MEDICARE

## 2023-11-16 VITALS
DIASTOLIC BLOOD PRESSURE: 68 MMHG | SYSTOLIC BLOOD PRESSURE: 116 MMHG | HEIGHT: 59 IN | WEIGHT: 98.6 LBS | BODY MASS INDEX: 19.88 KG/M2

## 2023-11-16 DIAGNOSIS — N95.2 VAGINAL ATROPHY: ICD-10-CM

## 2023-11-16 DIAGNOSIS — R10.2 PELVIC PAIN: Primary | ICD-10-CM

## 2023-11-16 PROCEDURE — 99212 OFFICE O/P EST SF 10 MIN: CPT

## 2023-11-16 ASSESSMENT — ENCOUNTER SYMPTOMS
ABDOMINAL DISTENTION: 0
DIARRHEA: 0
WHEEZING: 0
COLOR CHANGE: 0
CONSTIPATION: 0
CHEST TIGHTNESS: 0
ABDOMINAL PAIN: 1
SHORTNESS OF BREATH: 0
COUGH: 0
VOMITING: 0
NAUSEA: 0

## 2023-11-16 NOTE — PROGRESS NOTES
CHIEF COMPLAINT:     Chief Complaint   Patient presents with    Pelvic Pain     Patient presents today following in house gyn sono for pelvic pain. Feeling a lot of pressure. HPI:   Julia José presents today for follow up after ultrasound for pelvic pain. REVIEW OF SYSTEMS:  Review of Systems   Constitutional:  Negative for activity change, chills, diaphoresis, fatigue and fever. HENT:  Negative for congestion. Respiratory:  Negative for cough, chest tightness, shortness of breath and wheezing. Cardiovascular:  Negative for chest pain, palpitations and leg swelling. Gastrointestinal:  Positive for abdominal pain. Negative for abdominal distention, constipation, diarrhea, nausea and vomiting. Genitourinary:  Negative for dysuria, frequency, hematuria and urgency. Incontinence     Musculoskeletal:  Negative for arthralgias. Skin:  Negative for color change. Neurological:  Negative for dizziness, speech difficulty, weakness, light-headedness, numbness and headaches. Psychiatric/Behavioral:  Negative for agitation, behavioral problems, confusion, dysphoric mood, self-injury and suicidal ideas. The patient is not nervous/anxious. PHYSICAL EXAM:  Constitutional:   Blood pressure 116/68, height 1.499 m (4' 11\"), weight 44.7 kg (98 lb 9.6 oz). Wt Readings from Last 3 Encounters:   11/16/23 44.7 kg (98 lb 9.6 oz)   10/26/23 42.6 kg (94 lb)   10/23/23 42.6 kg (94 lb)       Physical Exam  Constitutional:       General: She is not in acute distress. Appearance: Normal appearance. She is not toxic-appearing or diaphoretic. HENT:      Head: Normocephalic. Mouth/Throat:      Mouth: Mucous membranes are moist.      Pharynx: Oropharynx is clear. Cardiovascular:      Rate and Rhythm: Normal rate and regular rhythm. Heart sounds: Normal heart sounds. Pulmonary:      Effort: Pulmonary effort is normal. No respiratory distress. Breath sounds: Normal breath sounds.  No

## 2023-12-04 ENCOUNTER — OFFICE VISIT (OUTPATIENT)
Dept: UROLOGY | Age: 58
End: 2023-12-04
Payer: MEDICARE

## 2023-12-04 VITALS
DIASTOLIC BLOOD PRESSURE: 74 MMHG | WEIGHT: 94 LBS | BODY MASS INDEX: 18.99 KG/M2 | SYSTOLIC BLOOD PRESSURE: 110 MMHG | TEMPERATURE: 97.1 F

## 2023-12-04 DIAGNOSIS — R63.4 WEIGHT LOSS: Primary | ICD-10-CM

## 2023-12-04 DIAGNOSIS — G80.9 CEREBRAL PALSY, UNSPECIFIED TYPE (HCC): ICD-10-CM

## 2023-12-04 DIAGNOSIS — N94.10 DYSPAREUNIA, FEMALE: ICD-10-CM

## 2023-12-04 DIAGNOSIS — N39.46 MIXED INCONTINENCE: ICD-10-CM

## 2023-12-04 DIAGNOSIS — R35.0 FREQUENCY OF URINATION: ICD-10-CM

## 2023-12-04 DIAGNOSIS — N95.2 VAGINAL ATROPHY: ICD-10-CM

## 2023-12-04 PROCEDURE — 99214 OFFICE O/P EST MOD 30 MIN: CPT | Performed by: NURSE PRACTITIONER

## 2023-12-04 RX ORDER — OXYBUTYNIN CHLORIDE 5 MG/1
5 TABLET, EXTENDED RELEASE ORAL DAILY
Qty: 30 TABLET | Refills: 3 | Status: SHIPPED | OUTPATIENT
Start: 2023-12-04

## 2023-12-04 RX ORDER — ESTRADIOL 0.1 MG/G
CREAM VAGINAL
Qty: 42.5 G | Refills: 1 | Status: SHIPPED | OUTPATIENT
Start: 2023-12-04

## 2023-12-04 RX ORDER — NUT TX, LACT-REDUCED, IRON 0.09G-2.25
273 LIQUID (ML) ORAL 2 TIMES DAILY
Qty: 1620 ML | Refills: 11 | Status: SHIPPED | OUTPATIENT
Start: 2023-12-04

## 2023-12-04 ASSESSMENT — ENCOUNTER SYMPTOMS
SHORTNESS OF BREATH: 0
COLOR CHANGE: 0
COUGH: 0
NAUSEA: 0
ABDOMINAL PAIN: 0
WHEEZING: 0
EYE REDNESS: 0
APNEA: 0
BACK PAIN: 0
VOMITING: 0
CONSTIPATION: 0

## 2023-12-04 NOTE — PROGRESS NOTES
HPI:        Patient is a 63 y.o. female in no acute distress. She is alert and oriented to person, place, and time. History  Cerebral palsy     Referral for incontinence. She developed urinary continence approximately 4 months ago after a bout of H. pylori. She also complains of urinary frequency and urgency. Prior to getting h.pylori she did have some urgency and rare UUI. She has fecal urgency, but denies fecal incontinence. She has nocturia 1-2 times per night, but she is already wet by the time she gets there. She is using 4 briefs per day with 6 pads per day. PVR is 140ml. She is having a soft daily bowel movement. She does pass urine and stool at the same time every time she goes to the bathroom. She denies any dysuria or gross hematuria. She only drinks water. In 2023 urine culture was positive for Klebsiella and group beta strep. She also had a CT in May that showed no evidence of  abnormalities. She has never seen urology in the past.   Started oxybutynin    Today  Here today to follow-up for incontinence. We did start her on oxybutynin at her last visit. She did not start this due to concern for the side effects. She did not call our office with any concerns. We did review side effect profile with her at her last visit. I did have an extensive conversation with her today about medications, risks and benefits and side effect profiles. She is using vaginal estrogen.   She is having a difficult time getting a hold of her primary care provider for a prescription for boost.    Past Medical History:   Diagnosis Date    CP (cerebral palsy) ([address])     H. pylori infection 2023     Past Surgical History:   Procedure Laterality Date     SECTION      COLONOSCOPY  2023    LEG SURGERY       Outpatient Encounter Medications as of 2023   Medication Sig Dispense Refill    estradiol (ESTRACE VAGINAL) 0.1 MG/GM vaginal cream Place a pea-sized amount vaginally, inner labia,

## 2023-12-04 NOTE — PATIENT INSTRUCTIONS
Drink 64-80 ounces of water every day        Survey: You may be receiving a survey from [employer] regarding your visit today. Please complete the survey to enable us to provide the highest quality of care to you and your family.     If you cannot score us as very good on any question, please call the office to discuss how we could have major experience exceptional.    Thank you    Your Urology Care Team.

## 2023-12-12 ENCOUNTER — OFFICE VISIT (OUTPATIENT)
Dept: GASTROENTEROLOGY | Age: 58
End: 2023-12-12
Payer: MEDICARE

## 2023-12-12 VITALS
DIASTOLIC BLOOD PRESSURE: 68 MMHG | BODY MASS INDEX: 19.76 KG/M2 | WEIGHT: 98 LBS | SYSTOLIC BLOOD PRESSURE: 100 MMHG | HEIGHT: 59 IN

## 2023-12-12 DIAGNOSIS — K21.9 CHRONIC GERD: Primary | ICD-10-CM

## 2023-12-12 PROCEDURE — 99213 OFFICE O/P EST LOW 20 MIN: CPT | Performed by: NURSE PRACTITIONER

## 2023-12-12 ASSESSMENT — ENCOUNTER SYMPTOMS
ANAL BLEEDING: 0
CONSTIPATION: 0
BLOOD IN STOOL: 0
ABDOMINAL PAIN: 0
TROUBLE SWALLOWING: 0
RESPIRATORY NEGATIVE: 1

## 2023-12-12 NOTE — PROGRESS NOTES
disease. Part B: Stomach, biopsy: Moderate chronic gastritis with Helicobacter pylori microorganisms seen. Part C: Gastroesophageal junction, biopsy: Benign esophageal mucosa and gastric glandular mucosa with mild chronic inflammation. Part D/C: Distal and proximal esophagus, biopsy: Benign esophageal epithelium. Part of colon terminal ileum, biopsy: Benign small bowel, negative for acute inflammation. Part G/H: Right and left colon, multiple biopsies: Benign colonic mucosa, negative for acute colitis. Part I: Rectum, polypectomy: Hyperplastic polyp    Assessment  Katarina Sheldon is a 63 y.o. old female who has a past medical history of cerebral palsy, H. pylori infection presenting as a new GI referral to establish care with a GI closer to her home. Today is a GI follow up. Patient is reporting improvement of symptoms, no further fecal incontinence. Reports her GERD has improved, she is off the omeprazole. She is having daily BMs and at times will have twice a day bowel movements. Has made adjustments to her diet, eating more green leafy vegetables and protein sources such as chicken or turkey. Today she reports she is feeling well, doing well. Trending weights indicate weight gain. Discussed GERD, advised omeprazole is available over-the-counter. Return in about 3 months (around 3/12/2024). Plan    1. Chronic GERD  - Omeprazole OTC, once daily.  - Pathophysiology and etiology of reflux discussed at length  - Lifestyle modification recommended and discussed with the patient   - Avoid NSAID use (examples provided)   -- Avoid fatty, spicy, acidic based, carbonated foods   --Limit coffee, tea, alcohol use   --Avoid meals 3 to 4 hrs before lying down   --Elevate the head of the bed    --Keep healthy weight    Yoandy Wallace received patient educational materials - see patient instructions. Discussed use, benefit, and side effects of prescribed medications. Treatment plan discussed at visit.  Continue

## 2023-12-12 NOTE — PATIENT INSTRUCTIONS
SURVEY:    You may be receiving a survey from [employer] regarding your visit today. You may get this in the mail, through your MyChart, or in your email. Please complete the survey to enable us to provide the highest quality of care to you and your family. If you cannot score us a very good (5 Stars) on any question, please call the office to discuss how we could of made your experience exceptional.    Thank you!     Dr. Michelle Laguerre MD  Excell Pall, APRN-CNP  KEMI Kelly LPN    Phone: [phone]  Fax: [phone]    Office Hours:   M-TH 8-5, F: 8-12

## 2023-12-15 ENCOUNTER — TELEPHONE (OUTPATIENT)
Dept: UROLOGY | Age: 58
End: 2023-12-15

## 2023-12-15 NOTE — TELEPHONE ENCOUNTER
PRATIMA-The patient called and advised that she continues to have frequency, urgency and has some \"gas pressure\". She notes that Glenn Began started her on Oxybutynin and she said she has not yet started the medication and asked if she should. I did advise her that Glenn Began prescribed her that due to the symptoms she is having and yes she should start it. I did ask her if her bowels are moving and she said yes, \"daily to every other day\". Writer advised her if she develops any complications to call the office or go to the ER if after hours. We will plan see her for her scheduled follow up appointment.

## 2024-01-15 ENCOUNTER — OFFICE VISIT (OUTPATIENT)
Dept: UROLOGY | Age: 59
End: 2024-01-15
Payer: MEDICARE

## 2024-01-15 VITALS
BODY MASS INDEX: 19.96 KG/M2 | SYSTOLIC BLOOD PRESSURE: 108 MMHG | WEIGHT: 98.8 LBS | HEART RATE: 78 BPM | TEMPERATURE: 97.7 F | DIASTOLIC BLOOD PRESSURE: 67 MMHG

## 2024-01-15 DIAGNOSIS — R10.32 LLQ PAIN: ICD-10-CM

## 2024-01-15 DIAGNOSIS — R39.16 STRAINS TO URINATE: ICD-10-CM

## 2024-01-15 DIAGNOSIS — R35.0 FREQUENCY OF URINATION: ICD-10-CM

## 2024-01-15 DIAGNOSIS — N39.46 MIXED INCONTINENCE: ICD-10-CM

## 2024-01-15 DIAGNOSIS — N95.2 VAGINAL ATROPHY: Primary | ICD-10-CM

## 2024-01-15 DIAGNOSIS — G80.9 CEREBRAL PALSY, UNSPECIFIED TYPE (HCC): ICD-10-CM

## 2024-01-15 PROCEDURE — 51798 US URINE CAPACITY MEASURE: CPT | Performed by: NURSE PRACTITIONER

## 2024-01-15 PROCEDURE — 99214 OFFICE O/P EST MOD 30 MIN: CPT | Performed by: NURSE PRACTITIONER

## 2024-01-15 RX ORDER — TAMSULOSIN HYDROCHLORIDE 0.4 MG/1
0.4 CAPSULE ORAL DAILY
Qty: 30 CAPSULE | Refills: 3 | Status: SHIPPED | OUTPATIENT
Start: 2024-01-15

## 2024-01-15 RX ORDER — TROSPIUM CHLORIDE ER 60 MG/1
60 CAPSULE ORAL DAILY
Qty: 30 CAPSULE | Refills: 2 | Status: SHIPPED | OUTPATIENT
Start: 2024-01-15

## 2024-01-15 ASSESSMENT — ENCOUNTER SYMPTOMS
NAUSEA: 0
CONSTIPATION: 0
BACK PAIN: 0
ABDOMINAL PAIN: 1
EYE REDNESS: 0
COUGH: 0
APNEA: 0
WHEEZING: 0
COLOR CHANGE: 0
SHORTNESS OF BREATH: 0
VOMITING: 0

## 2024-01-15 NOTE — PROGRESS NOTES
Bladderscan performed in office today:  Pt voided - at home about 1 hour ago, she did try to void at the office but was unable, PVR - 291 mL

## 2024-01-15 NOTE — PATIENT INSTRUCTIONS
Drink 64 ounces of water every day    Make yourself urinate every 2-3 hours while awake    Continue estrogen 3 night per week      SURVEY:    You may be receiving a survey from [employer] regarding your visit today.    Please complete the survey to enable us to provide the highest quality of care to you and your family.    If you cannot score us a very good on any question, please call the office to discuss how we could have made your experience a very good one.    Thank you.

## 2024-01-15 NOTE — PROGRESS NOTES
HPI:        Patient is a 59 y.o. female in no acute distress.  She is alert and oriented to person, place, and time.        History  Cerebral palsy     Referral for incontinence.  She developed urinary continence approximately 4 months ago after a bout of H. pylori.  She also complains of urinary frequency and urgency. Prior to getting h.pylori she did have some urgency and rare UUI.  She has fecal urgency, but denies fecal incontinence.  She has nocturia 1-2 times per night, but she is already wet by the time she gets there.  She is using 4 briefs per day with 6 pads per day.  PVR is 140ml. She is having a soft daily bowel movement.  She does pass urine and stool at the same time every time she goes to the bathroom. She denies any dysuria or gross hematuria. She only drinks water. In 5/2023 urine culture was positive for Klebsiella and group beta strep.  She also had a CT in May that showed no evidence of  abnormalities.  She has never seen urology in the past.   Started oxybutynin    12/2023 did not start oxybutynin due to concern for the side effects.  She did not call our office with any concerns.  We did review side effect profile with her at her last visit.  I did have an extensive conversation with her today about medications, risks and benefits and side effect profiles.  She is using vaginal estrogen.  She is having a difficult time getting a hold of her primary care provider for a prescription for boost.   Start oxybutynin    Today  Here today to follow-up for incontinence.  She is using vaginal estrogen 3 nights per week.  She did start oxybutynin.  She does not feel that she has had any improvement in her incontinence.  She is not practicing timed voiding.  During the day there is times that she will go 5-6 hours without urinating.  She states that she has no control over her urine.  She continues to use 4 briefs and 6 pads per day.  She is having a daily bowel movement.  She does develop left lower

## 2024-01-17 ENCOUNTER — TELEPHONE (OUTPATIENT)
Dept: UROLOGY | Age: 59
End: 2024-01-17

## 2024-01-17 NOTE — TELEPHONE ENCOUNTER
Pt called stating she had a major explosion of urine last night. This morning she woke up and had was able to void into the toilet without any help from medication. She said she was able to urinate and then have a bm separately, she is very excited she was able to go on her own.

## 2024-01-21 ENCOUNTER — HOSPITAL ENCOUNTER (EMERGENCY)
Age: 59
Discharge: HOME | End: 2024-01-21
Attending: EMERGENCY MEDICINE
Payer: MEDICARE

## 2024-01-21 VITALS
TEMPERATURE: 98.7 F | RESPIRATION RATE: 16 BRPM | SYSTOLIC BLOOD PRESSURE: 108 MMHG | OXYGEN SATURATION: 97 % | DIASTOLIC BLOOD PRESSURE: 62 MMHG | HEART RATE: 88 BPM

## 2024-01-21 DIAGNOSIS — N39.0 ACUTE UTI: Primary | ICD-10-CM

## 2024-01-21 LAB
ALBUMIN SERPL-MCNC: 4.2 G/DL (ref 3.5–5.2)
ALBUMIN/GLOB SERPL: 1.3 {RATIO} (ref 1–2.5)
ALP SERPL-CCNC: 87 U/L (ref 35–104)
ALT SERPL-CCNC: 30 U/L (ref 5–33)
ANION GAP SERPL CALCULATED.3IONS-SCNC: 12 MMOL/L (ref 9–17)
AST SERPL-CCNC: 26 U/L
BACTERIA URNS QL MICRO: ABNORMAL
BASOPHILS # BLD: 0.04 K/UL (ref 0–0.2)
BASOPHILS NFR BLD: 1 % (ref 0–2)
BILIRUB SERPL-MCNC: 0.2 MG/DL (ref 0.3–1.2)
BILIRUB UR QL STRIP: NEGATIVE
BUN SERPL-MCNC: 22 MG/DL (ref 6–20)
BUN/CREAT SERPL: 37 (ref 9–20)
CALCIUM SERPL-MCNC: 9.3 MG/DL (ref 8.6–10.4)
CHLORIDE SERPL-SCNC: 101 MMOL/L (ref 98–107)
CLARITY UR: CLEAR
CO2 SERPL-SCNC: 25 MMOL/L (ref 20–31)
COLOR UR: YELLOW
CREAT SERPL-MCNC: 0.6 MG/DL (ref 0.5–0.9)
EOSINOPHIL # BLD: 0.08 K/UL (ref 0–0.44)
EOSINOPHILS RELATIVE PERCENT: 1 % (ref 1–4)
EPI CELLS #/AREA URNS HPF: ABNORMAL /HPF (ref 0–25)
ERYTHROCYTE [DISTWIDTH] IN BLOOD BY AUTOMATED COUNT: 13.8 % (ref 11.8–14.4)
GFR SERPL CREATININE-BSD FRML MDRD: >60 ML/MIN/1.73M2
GLUCOSE SERPL-MCNC: 98 MG/DL (ref 70–99)
GLUCOSE UR STRIP-MCNC: NEGATIVE MG/DL
HCT VFR BLD AUTO: 43.4 % (ref 36.3–47.1)
HGB BLD-MCNC: 14 G/DL (ref 11.9–15.1)
HGB UR QL STRIP.AUTO: NEGATIVE
IMM GRANULOCYTES # BLD AUTO: <0.03 K/UL (ref 0–0.3)
IMM GRANULOCYTES NFR BLD: 0 %
KETONES UR STRIP-MCNC: NEGATIVE MG/DL
LACTATE BLDV-SCNC: 1.1 MMOL/L (ref 0.5–2.2)
LEUKOCYTE ESTERASE UR QL STRIP: ABNORMAL
LYMPHOCYTES NFR BLD: 4.09 K/UL (ref 1.1–3.7)
LYMPHOCYTES RELATIVE PERCENT: 47 % (ref 24–43)
MCH RBC QN AUTO: 29.4 PG (ref 25.2–33.5)
MCHC RBC AUTO-ENTMCNC: 32.3 G/DL (ref 28.4–34.8)
MCV RBC AUTO: 91 FL (ref 82.6–102.9)
MONOCYTES NFR BLD: 0.55 K/UL (ref 0.1–1.2)
MONOCYTES NFR BLD: 6 % (ref 3–12)
NEUTROPHILS NFR BLD: 45 % (ref 36–65)
NEUTS SEG NFR BLD: 3.87 K/UL (ref 1.5–8.1)
NITRITE UR QL STRIP: POSITIVE
NRBC BLD-RTO: 0 PER 100 WBC
PH UR STRIP: 6.5 [PH] (ref 5–9)
PLATELET # BLD AUTO: 289 K/UL (ref 138–453)
PMV BLD AUTO: 10.6 FL (ref 8.1–13.5)
POTASSIUM SERPL-SCNC: 3.9 MMOL/L (ref 3.7–5.3)
PROT SERPL-MCNC: 7.5 G/DL (ref 6.4–8.3)
PROT UR STRIP-MCNC: NEGATIVE MG/DL
RBC # BLD AUTO: 4.77 M/UL (ref 3.95–5.11)
RBC #/AREA URNS HPF: ABNORMAL /HPF (ref 0–2)
SODIUM SERPL-SCNC: 138 MMOL/L (ref 135–144)
SP GR UR STRIP: <1.005 (ref 1.01–1.02)
UROBILINOGEN UR STRIP-ACNC: NORMAL EU/DL (ref 0–1)
WBC #/AREA URNS HPF: ABNORMAL /HPF (ref 0–5)
WBC OTHER # BLD: 8.7 K/UL (ref 3.5–11.3)

## 2024-01-21 PROCEDURE — 36415 COLL VENOUS BLD VENIPUNCTURE: CPT

## 2024-01-21 PROCEDURE — 87086 URINE CULTURE/COLONY COUNT: CPT

## 2024-01-21 PROCEDURE — 85025 COMPLETE CBC W/AUTO DIFF WBC: CPT

## 2024-01-21 PROCEDURE — 99284 EMERGENCY DEPT VISIT MOD MDM: CPT

## 2024-01-21 PROCEDURE — 6360000002 HC RX W HCPCS: Performed by: EMERGENCY MEDICINE

## 2024-01-21 PROCEDURE — 80053 COMPREHEN METABOLIC PANEL: CPT

## 2024-01-21 PROCEDURE — 2580000003 HC RX 258: Performed by: EMERGENCY MEDICINE

## 2024-01-21 PROCEDURE — 96365 THER/PROPH/DIAG IV INF INIT: CPT

## 2024-01-21 PROCEDURE — 87077 CULTURE AEROBIC IDENTIFY: CPT

## 2024-01-21 PROCEDURE — 87186 SC STD MICRODIL/AGAR DIL: CPT

## 2024-01-21 PROCEDURE — 83605 ASSAY OF LACTIC ACID: CPT

## 2024-01-21 PROCEDURE — 81001 URINALYSIS AUTO W/SCOPE: CPT

## 2024-01-21 RX ORDER — CEPHALEXIN 500 MG/1
500 CAPSULE ORAL 2 TIMES DAILY
Qty: 14 CAPSULE | Refills: 0 | Status: SHIPPED | OUTPATIENT
Start: 2024-01-21 | End: 2024-01-28

## 2024-01-21 RX ADMIN — CEFTRIAXONE SODIUM 1000 MG: 1 INJECTION, POWDER, FOR SOLUTION INTRAMUSCULAR; INTRAVENOUS at 22:05

## 2024-01-21 ASSESSMENT — PAIN - FUNCTIONAL ASSESSMENT: PAIN_FUNCTIONAL_ASSESSMENT: 0-10

## 2024-01-21 ASSESSMENT — PAIN DESCRIPTION - LOCATION: LOCATION: ABDOMEN

## 2024-01-22 ENCOUNTER — TELEPHONE (OUTPATIENT)
Dept: UROLOGY | Age: 59
End: 2024-01-22

## 2024-01-22 NOTE — TELEPHONE ENCOUNTER
Please let her know the urine culture is still in process, the ER should reach out to her once culture is finalized    She can continue using her vaginal estrogen 3 days a week, she can even take this day of mammogram

## 2024-01-22 NOTE — TELEPHONE ENCOUNTER
Patient called, she was in the ER this past weekend, a UTI has been confirmed, she is currently on an anabiotic. She would like to know if she should use her estrogen cream the day has her mammogram.       Please advise

## 2024-01-22 NOTE — ED PROVIDER NOTES
Partners: Male   Other Topics Concern    Not on file   Social History Narrative    Not on file     Social Determinants of Health     Financial Resource Strain: Low Risk  (10/12/2023)    Overall Financial Resource Strain (CARDIA)     Difficulty of Paying Living Expenses: Not hard at all   Food Insecurity: Not on file (10/12/2023)   Transportation Needs: Unknown (10/12/2023)    PRAPARE - Transportation     Lack of Transportation (Medical): Not on file     Lack of Transportation (Non-Medical): No   Physical Activity: Not on file   Stress: Not on file   Social Connections: Not on file   Intimate Partner Violence: Not on file   Housing Stability: Unknown (10/12/2023)    Housing Stability Vital Sign     Unable to Pay for Housing in the Last Year: Not on file     Number of Places Lived in the Last Year: Not on file     Unstable Housing in the Last Year: No       Family History   Problem Relation Age of Onset    Cancer Father         Colon cancer    Breast Cancer Neg Hx     Ovarian Cancer Neg Hx     Stroke Neg Hx        Allergies:  Patient has no known allergies.    Home Medications:  Prior to Admission medications    Medication Sig Start Date End Date Taking? Authorizing Provider   cephALEXin (KEFLEX) 500 MG capsule Take 1 capsule by mouth 2 times daily for 7 days 1/21/24 1/28/24 Yes Daisy Nguyen DO   trospium (SANCTURA) 60 MG CP24 extended release capsule Take 1 capsule by mouth daily 1/15/24   Inocencia Lowe APRN - CNP   tamsulosin (FLOMAX) 0.4 MG capsule Take 1 capsule by mouth daily 1/15/24   Inocencia Lowe APRN - CNP   estradiol (ESTRACE VAGINAL) 0.1 MG/GM vaginal cream Place a pea-sized amount vaginally, inner labia, and to vestibule nightly 3 nights per week. Do NOT use plastic applicator. 12/4/23   Inocencia Lowe APRN - CNP   Nutritional Supplements (BOOST VERY HIGH CALORIE) LIQD Take 273 mLs by mouth in the morning and at bedtime 12/4/23   Inocencia Lowe APRN - CNP   acetaminophen

## 2024-01-22 NOTE — DISCHARGE INSTRUCTIONS
Please take antibiotics as prescribed, follow-up with your primary care provider, as well as with urology in the next 2 to 3 days.  Return to ER for worsening abdominal pain.

## 2024-01-24 ENCOUNTER — HOSPITAL ENCOUNTER (OUTPATIENT)
Dept: WOMENS IMAGING | Age: 59
Discharge: HOME | End: 2024-01-26
Payer: MEDICARE

## 2024-01-24 DIAGNOSIS — Z12.31 ENCOUNTER FOR SCREENING MAMMOGRAM FOR MALIGNANT NEOPLASM OF BREAST: ICD-10-CM

## 2024-01-24 LAB
MICROORGANISM SPEC CULT: ABNORMAL
SPECIMEN DESCRIPTION: ABNORMAL

## 2024-01-24 PROCEDURE — 77063 BREAST TOMOSYNTHESIS BI: CPT

## 2024-01-25 ENCOUNTER — TELEPHONE (OUTPATIENT)
Dept: GASTROENTEROLOGY | Age: 59
End: 2024-01-25

## 2024-01-25 ENCOUNTER — TELEPHONE (OUTPATIENT)
Dept: UROLOGY | Age: 59
End: 2024-01-25

## 2024-01-25 NOTE — TELEPHONE ENCOUNTER
Patient calling office and would like to report she was in ED this past Sunday and diagnosed with UTI. She reports that other testing was completed and she is concerned about her blood work results. Patient is scheduled with urology on 2/26, and here for follow up 3/12. This nurse advised patient that message will be sent and that patient should also call her PCP to advise of ED visit and request to be sooner by PCP for ED follow up. Patient in agreement and voiced knowledge and understanding.

## 2024-01-25 NOTE — TELEPHONE ENCOUNTER
Patient wants her urine culture and labs reviewed  She said no one had called her with an explanation but she saw results on my chart. She said her culture and labs are abnormal. She said they lost a  daughter after a liver transplant and her bilirubin was abnormal..  Patient said her bilirubin is abnormal as well as other things.  Her PCP told her to call here since we ordered the test.

## 2024-01-25 NOTE — TELEPHONE ENCOUNTER
Please call her back and let her know that the lab work and urine culture that are in the system were ordered by the ER physician.  We did not order this lab work, but would be happy to review this for her.    Please let her know her bilirubin was low and liver function was normal.  There would be concern if the bilirubin was too high, this is not the case.  On imaging ordered by another provider in May 2023 there were some small cysts on the liver which are likely benign.  The liver was otherwise normal.    Her white blood cell count and hemoglobin were normal.    Urine culture in the emergency room did grow a bacteria called Citrobacter.  The antibiotic started in the emergency room does cover this infection.  We do recommend that she completes the course of antibiotics.

## 2024-01-26 ENCOUNTER — HOSPITAL ENCOUNTER (EMERGENCY)
Age: 59
Discharge: HOME | End: 2024-01-26
Attending: EMERGENCY MEDICINE
Payer: MEDICARE

## 2024-01-26 ENCOUNTER — APPOINTMENT (OUTPATIENT)
Dept: CT IMAGING | Age: 59
End: 2024-01-26
Payer: MEDICARE

## 2024-01-26 VITALS
DIASTOLIC BLOOD PRESSURE: 65 MMHG | OXYGEN SATURATION: 98 % | RESPIRATION RATE: 16 BRPM | TEMPERATURE: 97.9 F | HEART RATE: 70 BPM | SYSTOLIC BLOOD PRESSURE: 120 MMHG

## 2024-01-26 DIAGNOSIS — R10.9 FLANK PAIN: Primary | ICD-10-CM

## 2024-01-26 LAB
BACTERIA URNS QL MICRO: ABNORMAL
BILIRUB UR QL STRIP: NEGATIVE
CLARITY UR: CLEAR
COLOR UR: ABNORMAL
EPI CELLS #/AREA URNS HPF: ABNORMAL /HPF (ref 0–25)
GLUCOSE UR STRIP-MCNC: NEGATIVE MG/DL
HGB UR QL STRIP.AUTO: NEGATIVE
KETONES UR STRIP-MCNC: NEGATIVE MG/DL
LEUKOCYTE ESTERASE UR QL STRIP: NEGATIVE
NITRITE UR QL STRIP: POSITIVE
PH UR STRIP: 6.5 [PH] (ref 5–9)
PROT UR STRIP-MCNC: NEGATIVE MG/DL
RBC #/AREA URNS HPF: ABNORMAL /HPF (ref 0–2)
SP GR UR STRIP: <1.005 (ref 1.01–1.02)
UROBILINOGEN UR STRIP-ACNC: NORMAL EU/DL (ref 0–1)
WBC #/AREA URNS HPF: ABNORMAL /HPF (ref 0–5)

## 2024-01-26 PROCEDURE — 81001 URINALYSIS AUTO W/SCOPE: CPT

## 2024-01-26 PROCEDURE — 74176 CT ABD & PELVIS W/O CONTRAST: CPT

## 2024-01-26 PROCEDURE — 99284 EMERGENCY DEPT VISIT MOD MDM: CPT

## 2024-01-26 RX ORDER — 0.9 % SODIUM CHLORIDE 0.9 %
1000 INTRAVENOUS SOLUTION INTRAVENOUS ONCE
Status: DISCONTINUED | OUTPATIENT
Start: 2024-01-26 | End: 2024-01-26

## 2024-01-26 ASSESSMENT — PAIN - FUNCTIONAL ASSESSMENT: PAIN_FUNCTIONAL_ASSESSMENT: NONE - DENIES PAIN

## 2024-01-26 NOTE — ED PROVIDER NOTES
[facility] ED  EMERGENCY DEPARTMENT ENCOUNTER      Pt Name: David Choudhary  MRN: [id number]  Birthdate 1965  Date of evaluation: 2024  Provider: Celina Watson MD    CHIEF COMPLAINT       Chief Complaint   Patient presents with    Flank Pain     Left sided flank pain started , is being treated for uti. Has increased pain         HISTORY OF PRESENT ILLNESS   (Location/Symptom, Timing/Onset, Context/Setting, Quality, Duration, Modifying Factors, Severity)  Note limiting factors.   David Choudhary is a 59 y.o. female who presents to the emergency department ***     HPI    Nursing Notes were reviewed.    REVIEW OF SYSTEMS    (2-9 systems for level 4, 10 or more for level 5)     Review of Systems    Except as noted above the remainder of the review of systems was reviewed and negative.       PAST MEDICAL HISTORY     Past Medical History:   Diagnosis Date    CP (cerebral palsy) (HCC)     H. pylori infection 2023         SURGICAL HISTORY       Past Surgical History:   Procedure Laterality Date     SECTION      COLONOSCOPY  2023    Repeat 3 years for polyp surveillance    LEG SURGERY      UPPER GASTROINTESTINAL ENDOSCOPY  2023         CURRENT MEDICATIONS       Previous Medications    ACETAMINOPHEN (TYLENOL) 500 MG TABLET    Take 2 tablets by mouth every 6 hours as needed for Pain    CEPHALEXIN (KEFLEX) 500 MG CAPSULE    Take 1 capsule by mouth 2 times daily for 7 days    ESTRADIOL (ESTRACE VAGINAL) 0.1 MG/GM VAGINAL CREAM    Place a pea-sized amount vaginally, inner labia, and to vestibule nightly 3 nights per week. Do NOT use plastic applicator.    NUTRITIONAL SUPPLEMENTS (BOOST VERY HIGH CALORIE) LIQD    Take 273 mLs by mouth in the morning and at bedtime    TAMSULOSIN (FLOMAX) 0.4 MG CAPSULE    Take 1 capsule by mouth daily    TROSPIUM (SANCTURA) 60 MG CP24 EXTENDED RELEASE CAPSULE    Take 1 capsule by mouth daily       ALLERGIES     Patient has no known allergies.    FAMILY

## 2024-01-27 NOTE — DISCHARGE INSTRUCTIONS
Continue current medications as prescribed.  Follow-up with your primary care provider for reevaluation in 2 to 3 days if symptoms not resolved.  Take Tylenol and or Motrin for pain.  May also try heat or ice for 5 to 10-minute intervals if this helps or over-the-counter product such as IcyHot Biofreeze or similar.  Please return immediately for any acute concerns

## 2024-02-05 ENCOUNTER — TELEPHONE (OUTPATIENT)
Dept: UROLOGY | Age: 59
End: 2024-02-05

## 2024-02-05 NOTE — TELEPHONE ENCOUNTER
Patient received two medications trospium and the tamsulosin. She would like clarity, is she able to take them at the same time or if she should take one in the morning and the other in the evening. She is wanting to make sure she is able to take together without interaction. She wants to make she is taking them correct. She said she is still having difficulties with emptying her bladder.       Please advise

## 2024-02-05 NOTE — TELEPHONE ENCOUNTER
She can take both of them. She may take them at the same time.      If she is unable to urinate at all she is to stop the Sanctura/trospium and contact our office.

## 2024-02-05 NOTE — TELEPHONE ENCOUNTER
Writer called patient and advised her of response patient voiced understanding. She said she was able to have a BM and void on her own Saturday without medication so she was happy about that.

## 2024-02-15 ENCOUNTER — HOSPITAL ENCOUNTER (OUTPATIENT)
Age: 59
Setting detail: SPECIMEN
Discharge: HOME | End: 2024-02-15
Payer: MEDICARE

## 2024-02-15 DIAGNOSIS — R39.16 STRAINS TO URINATE: ICD-10-CM

## 2024-02-15 DIAGNOSIS — R10.32 LLQ PAIN: ICD-10-CM

## 2024-02-15 PROCEDURE — 87086 URINE CULTURE/COLONY COUNT: CPT

## 2024-02-15 PROCEDURE — 87186 SC STD MICRODIL/AGAR DIL: CPT

## 2024-02-15 PROCEDURE — 87088 URINE BACTERIA CULTURE: CPT

## 2024-02-16 LAB
MICROORGANISM SPEC CULT: ABNORMAL
SPECIMEN DESCRIPTION: ABNORMAL

## 2024-02-19 ENCOUNTER — TELEPHONE (OUTPATIENT)
Dept: UROLOGY | Age: 59
End: 2024-02-19

## 2024-02-19 RX ORDER — CEFDINIR 300 MG/1
300 CAPSULE ORAL 2 TIMES DAILY
Qty: 14 CAPSULE | Refills: 0 | Status: SHIPPED | OUTPATIENT
Start: 2024-02-19 | End: 2024-02-26

## 2024-02-19 NOTE — TELEPHONE ENCOUNTER
UACS is positive for infection. I sent in culture specific omnicef. Take this antibiotic until gone. Take this with food and eat yogurt once per day to prevent GI upset. If you develop nausea, vomiting, or fevers call the office or go to the ER. If your urinary symptoms do not improve once completing the antibiotics call our office.

## 2024-02-19 NOTE — TELEPHONE ENCOUNTER
Writer attempted to contact patient unable to leave voicemail. Per communication we are aloud to talk to Meliton regarding results. Left detailed voicemail regarding results/response.

## 2024-02-20 NOTE — TELEPHONE ENCOUNTER
Informed patient of the results/message. She said she runs a low garde temp in [facility], Per Inocencia let us know if it is 100.1 or she has other symptoms.  Then patient said before she starts the Flomax and trospium she wants to know what they are for and what she should watch for on them. She said someone told her to  stop them if she can't urinate.

## 2024-02-20 NOTE — TELEPHONE ENCOUNTER
Writer attempted to contact patient. Unable to leave voicemail. Writer called pharmacy to see if patient picked up medication. Writer spoke with Kelin at pharmacy she verified patient picked up medication yesterday.

## 2024-02-20 NOTE — TELEPHONE ENCOUNTER
Writer called patient. Advised her of response. Patient voiced understanding. She said she will start medications tomorrow.

## 2024-02-20 NOTE — TELEPHONE ENCOUNTER
As discussed during her office visit:     Flomax is to help her muscles around her bladder neck work more effectively when she goes to urinate    Trospium is to help with incontinence by helping the brain and bladder communicate with each other better    These medication should not be stopped for UTIs    If she has stopped these medications or never started them she needs to be rescheduled for 6 weeks from now    Make sure she is applying estrogen inside the vagina and to around the urethra to prevent UTIs

## 2024-02-28 ENCOUNTER — TELEPHONE (OUTPATIENT)
Dept: UROLOGY | Age: 59
End: 2024-02-28

## 2024-02-28 NOTE — TELEPHONE ENCOUNTER
Patient called to let us know she was recently diagnosed with a UTI. She has finished her antibiotic, but she is still having severe symptoms. She has such severe pain which lasts about 10 minutes. She is unable to move. She has not started taking the trospium or the tamsulosin. She would like to know what she should do?? Patient says she has approximately two episodes a day, which incapacitate her for approximately 10 minutes. Patient has applied to heat the area and it helps a little bit. Once she urinates the pain is lessened every so slightly, then reoccurs. Patient said it is causing her extensive pain in her lower lady parts.      Please Advise

## 2024-02-28 NOTE — TELEPHONE ENCOUNTER
This is one of the reasons we gave her the medications! She needs to take the medications as prescribed so we can help her

## 2024-02-28 NOTE — TELEPHONE ENCOUNTER
Writer notified patient and she verbalized understanding. Patient said she will start taking them right away.

## 2024-03-12 ENCOUNTER — OFFICE VISIT (OUTPATIENT)
Dept: GASTROENTEROLOGY | Age: 59
End: 2024-03-12
Payer: MEDICARE

## 2024-03-12 VITALS
DIASTOLIC BLOOD PRESSURE: 67 MMHG | HEART RATE: 80 BPM | BODY MASS INDEX: 20.44 KG/M2 | HEIGHT: 59 IN | RESPIRATION RATE: 18 BRPM | OXYGEN SATURATION: 98 % | WEIGHT: 101.4 LBS | TEMPERATURE: 98.1 F | SYSTOLIC BLOOD PRESSURE: 105 MMHG

## 2024-03-12 DIAGNOSIS — K21.9 CHRONIC GERD: Primary | ICD-10-CM

## 2024-03-12 PROCEDURE — 99213 OFFICE O/P EST LOW 20 MIN: CPT | Performed by: NURSE PRACTITIONER

## 2024-03-12 RX ORDER — HYDROXYZINE HYDROCHLORIDE 25 MG/1
25 TABLET, FILM COATED ORAL 3 TIMES DAILY PRN
COMMUNITY
Start: 2024-01-30

## 2024-03-12 NOTE — PATIENT INSTRUCTIONS
SURVEY:    You may be receiving a survey from [facility] regarding your visit today.    You may get this in the mail, through your MyChart, or in your email.     Please complete the survey to enable us to provide the highest quality of care to you and your family.    If you cannot score us a very good (5 Stars) on any question, please call the office to discuss how we could of made your experience exceptional.    Thank you!    MD Elena Plata, APRN-CLAUDINE Titus LPN Michelle, LPN    Phone: [phone]  Fax: [phone]    Office Hours:   M-TH 8-5, F: 8-12

## 2024-03-12 NOTE — PROGRESS NOTES
utilized to reduce the radiation dose to as low  as reasonably achievable.    COMPARISON:  5/10/2023    HISTORY:  ORDERING SYSTEM PROVIDED HISTORY: flank pain  TECHNOLOGIST PROVIDED HISTORY: Flank pain  Decision Support Exception - unselect if not a suspected or confirmed  emergency medical condition->Emergency Medical Condition (MA)    FINDINGS:  LOWER CHEST:  Visualized portion of the lower chest demonstrates no acute  abnormality.    KIDNEYS AND URINARY TRACT: No renal calculi are identified. There is no  evidence for hydronephrosis.  The ureters are of normal course and caliber.    ORGANS: Visualized portions of the unenhanced liver, spleen, pancreas, and  adrenal glands demonstrate no acute abnormality. No inflammatory changes in  the gallbladder fossa.    GI/BOWEL: No bowel obstruction.  No evidence of acute appendicitis.    PELVIS: The bladder and pelvic organs are unremarkable.    PERITONEUM/RETROPERITONEUM: No lymphadenopathy is noted.  Mild fat-containing  periumbilical hernia.    BONES/SOFT TISSUES: The osseous structures demonstrate no acute abnormality.    Impression  1. No evidence of obstructive uropathy.  No evidence of renal stones.  2. Mild fat containing periumbilical hernia.    Colonoscopy and EGD May 31, 2023:  Patient colonoscopy is colon cancer screening, altered bowel habits, family history of colon cancer.  Exam completed till cecum and terminal ileum.  Fair bowel prep.  Large amount of liquid stool in the right and left colon segments that was attempted to be cleared with large volume of water lavage and suctioning.  Despite best efforts small lesions, flat lesions may be missed.  HELEN showed intact anal sphincter tone.  No palpable mass lesions, strictures or stenosis.  TI mucosa was normal-status post random biopsies to rule out IBD.  Exam mucosa to the cecum, ascending colon, hepatic flexure, transverse colon, splenic flexure, descending colon, sigmoid colon, rectum was normal except:  -

## 2024-03-16 ASSESSMENT — ENCOUNTER SYMPTOMS
ANAL BLEEDING: 0
BLOOD IN STOOL: 0
TROUBLE SWALLOWING: 0
ABDOMINAL PAIN: 0

## 2024-04-02 ENCOUNTER — OFFICE VISIT (OUTPATIENT)
Dept: UROLOGY | Age: 59
End: 2024-04-02
Payer: MEDICARE

## 2024-04-02 VITALS
WEIGHT: 103 LBS | TEMPERATURE: 98.6 F | SYSTOLIC BLOOD PRESSURE: 105 MMHG | HEART RATE: 83 BPM | BODY MASS INDEX: 20.79 KG/M2 | DIASTOLIC BLOOD PRESSURE: 68 MMHG

## 2024-04-02 DIAGNOSIS — N95.2 VAGINAL ATROPHY: ICD-10-CM

## 2024-04-02 DIAGNOSIS — N39.46 MIXED INCONTINENCE: Primary | ICD-10-CM

## 2024-04-02 PROCEDURE — 99214 OFFICE O/P EST MOD 30 MIN: CPT | Performed by: NURSE PRACTITIONER

## 2024-04-02 PROCEDURE — 51798 US URINE CAPACITY MEASURE: CPT | Performed by: NURSE PRACTITIONER

## 2024-04-02 NOTE — PROGRESS NOTES
Bladderscan performed in office today:  Pt voided - patient voided 1hr 1/2 before appt.  PVR - 273 mL

## 2024-04-02 NOTE — PROGRESS NOTES
History  Cerebral palsy     Referral for incontinence.  She developed urinary continence approximately 4 months ago after a bout of H. pylori.  She also complains of urinary frequency and urgency. Prior to getting h.pylori she did have some urgency and rare UUI.  She has fecal urgency, but denies fecal incontinence.  She has nocturia 1-2 times per night, but she is already wet by the time she gets there.  She is using 4 briefs per day with 6 pads per day.  PVR is 140ml. She is having a soft daily bowel movement.  She does pass urine and stool at the same time every time she goes to the bathroom. She denies any dysuria or gross hematuria. She only drinks water. In 5/2023 urine culture was positive for Klebsiella and group beta strep.  She also had a CT in May that showed no evidence of  abnormalities.  She has never seen urology in the past.   Started oxybutynin    12/2023 did not start oxybutynin due to concern for the side effects.  She did not call our office with any concerns.  We did review side effect profile with her at her last visit.  I did have an extensive conversation with her today about medications, risks and benefits and side effect profiles.  She is using vaginal estrogen.  She is having a difficult time getting a hold of her primary care provider for a prescription for boost.   Start oxybutynin    Today  Here today to follow-up for incontinence.  She is using vaginal estrogen 3 nights per week.  At her last visit we did prescribe Flomax and trospium, but she did not start these until 2 weeks ago.  She continues to use 4 briefs and 6 pads per day.  She is having a daily bowel movement.  She does develop left lower quadrant pain when she needs to void, this does resolve after voiding.  She urinated 1.5 hours ago, random bladder scan is 273 mL.  She is unable to void in the office.  She denies any dysuria or gross hematuria.      Plan  I did explain to her that it can take these medications up to 6

## 2024-04-09 ENCOUNTER — TELEPHONE (OUTPATIENT)
Dept: UROLOGY | Age: 59
End: 2024-04-09

## 2024-04-09 RX ORDER — TAMSULOSIN HYDROCHLORIDE 0.4 MG/1
0.4 CAPSULE ORAL DAILY
Qty: 90 CAPSULE | Refills: 3 | Status: SHIPPED | OUTPATIENT
Start: 2024-04-09

## 2024-04-29 ENCOUNTER — TELEPHONE (OUTPATIENT)
Dept: UROLOGY | Age: 59
End: 2024-04-29

## 2024-04-29 DIAGNOSIS — R30.0 DYSURIA: Primary | ICD-10-CM

## 2024-04-29 NOTE — TELEPHONE ENCOUNTER
Due to the dysuria we will check a urine culture.  We will wait for culture results before we start antibiotics

## 2024-04-29 NOTE — TELEPHONE ENCOUNTER
Left a detailed message for the patient to advise she will need to provide a urine sample for a urine culture.  The provider will order antibiotics when the culture is complete.  This nurse requested the patient call the office to confirm receipt of this information.

## 2024-04-29 NOTE — TELEPHONE ENCOUNTER
Patient reports she went to her primary care physician.  The patient reports she was having cloudy urine, dysuria and had \"pressure\" in her vaginal area. Patient denies fever, nausea or vomiting.  Patient states they prescribed an antibiotic, but she is not certain what antibiotic.       Phone call to Delilah Romo NP office and spoke with Daisy.  Daisy states she did not have a urine culture, but the urinalysis shows bacteria.  Deillah Romo did not order an antibiotic, as she would like urology to address.  Daisy will fax the urinalysis results to [facility].

## 2024-04-30 ENCOUNTER — HOSPITAL ENCOUNTER (OUTPATIENT)
Age: 59
Discharge: HOME | End: 2024-04-30
Payer: MEDICARE

## 2024-04-30 DIAGNOSIS — R30.0 DYSURIA: ICD-10-CM

## 2024-04-30 PROCEDURE — 87186 SC STD MICRODIL/AGAR DIL: CPT

## 2024-04-30 PROCEDURE — 87086 URINE CULTURE/COLONY COUNT: CPT

## 2024-04-30 PROCEDURE — 87077 CULTURE AEROBIC IDENTIFY: CPT

## 2024-04-30 NOTE — TELEPHONE ENCOUNTER
Patient returned call with confirmation of message received. She states that she will come into the lab today to give a urine sample.

## 2024-05-02 ENCOUNTER — TELEPHONE (OUTPATIENT)
Dept: UROLOGY | Age: 59
End: 2024-05-02

## 2024-05-02 LAB
MICROORGANISM SPEC CULT: ABNORMAL
SPECIMEN DESCRIPTION: ABNORMAL

## 2024-05-02 RX ORDER — SULFAMETHOXAZOLE AND TRIMETHOPRIM 800; 160 MG/1; MG/1
1 TABLET ORAL 2 TIMES DAILY
Qty: 14 TABLET | Refills: 0 | Status: SHIPPED | OUTPATIENT
Start: 2024-05-02 | End: 2024-05-09

## 2024-05-02 NOTE — TELEPHONE ENCOUNTER
Writer called patient and advised her of response. Patient voiced understanding. She will mention these symptoms to her PCP

## 2024-05-02 NOTE — TELEPHONE ENCOUNTER
Writer called patient advised her of response/results. Patient states she is miserable. She has aches all over,legs and feet hurt,it feels like theres sinus infection behind her eyes,her stomach hurts, she has vibrating in lower back and night sweats. Vibrating in lower back and stomach has been going on for 6 months or more off and on. Her temperature this morning was 99.3. She hasn't eaten anything in two days. Patient states she will  medication its hard for her to eat without appetite. She drinks nutrition shakes and eats yogurt.

## 2024-05-02 NOTE — TELEPHONE ENCOUNTER
Urine culture is positive for infection.  We sent in culture specific Bactrim. Take this antibiotic until gone. Take this with food and eat yogurt once per day to prevent GI upset. If you develop nausea, vomiting, or fevers call the office or go to the ER. If your urinary symptoms do not improve once completing the antibiotics call our office.

## 2024-05-02 NOTE — TELEPHONE ENCOUNTER
The sinus symptoms, abdominal symptoms and back symptoms do not have anything to do with having a UTI or urinary issues. I would recommend she see her PCP

## 2024-05-13 NOTE — PROGRESS NOTES
History  Cerebral palsy     Referral for incontinence.  She developed urinary continence approximately 4 months ago after a bout of H. pylori.  She also complains of urinary frequency and urgency. Prior to getting h.pylori she did have some urgency and rare UUI.  She has fecal urgency, but denies fecal incontinence.  She has nocturia 1-2 times per night, but she is already wet by the time she gets there.  She is using 4 briefs per day with 6 pads per day.  PVR is 140ml. She is having a soft daily bowel movement.  She does pass urine and stool at the same time every time she goes to the bathroom. She denies any dysuria or gross hematuria. She only drinks water. In 5/2023 urine culture was positive for Klebsiella and group beta strep.  She also had a CT in May that showed no evidence of  abnormalities.  She has never seen urology in the past.   Started oxybutynin    12/2023 did not start oxybutynin due to concern for the side effects.  She did not call our office with any concerns.  We did review side effect profile with her at her last visit.  I did have an extensive conversation with her today about medications, risks and benefits and side effect profiles.  She is using vaginal estrogen.  She is having a difficult time getting a hold of her primary care provider for a prescription for boost.   Start oxybutynin    4/2024 follow-up for incontinence.  She is using vaginal estrogen 3 nights per week.  At her last visit we did prescribe Flomax and trospium, but she did not start these until 2 weeks ago.  She continues to use 4 briefs and 6 pads per day.  She is having a daily bowel movement.  She does develop left lower quadrant pain when she needs to void, this does resolve after voiding.  She urinated 1.5 hours ago, random bladder scan is 273 mL.  She is unable to void in the office.  She denies any dysuria or gross hematuria.      Urine cultures  4/2024 - klebsiella  2/2024 - e.coli  1/2024 -

## 2024-05-14 ENCOUNTER — TELEPHONE (OUTPATIENT)
Dept: UROLOGY | Age: 59
End: 2024-05-14

## 2024-05-14 ENCOUNTER — OFFICE VISIT (OUTPATIENT)
Dept: UROLOGY | Age: 59
End: 2024-05-14
Payer: MEDICARE

## 2024-05-14 VITALS
HEIGHT: 59 IN | HEART RATE: 81 BPM | BODY MASS INDEX: 21.57 KG/M2 | SYSTOLIC BLOOD PRESSURE: 105 MMHG | WEIGHT: 107 LBS | TEMPERATURE: 98.4 F | DIASTOLIC BLOOD PRESSURE: 66 MMHG

## 2024-05-14 DIAGNOSIS — N95.8 GENITOURINARY SYNDROME OF MENOPAUSE: ICD-10-CM

## 2024-05-14 DIAGNOSIS — N39.46 MIXED INCONTINENCE: Primary | ICD-10-CM

## 2024-05-14 PROCEDURE — 51798 US URINE CAPACITY MEASURE: CPT | Performed by: NURSE PRACTITIONER

## 2024-05-14 PROCEDURE — 99214 OFFICE O/P EST MOD 30 MIN: CPT | Performed by: NURSE PRACTITIONER

## 2024-05-14 RX ORDER — TROSPIUM CHLORIDE ER 60 MG/1
60 CAPSULE ORAL DAILY
Qty: 90 CAPSULE | Refills: 3 | Status: SHIPPED | OUTPATIENT
Start: 2024-05-14

## 2024-05-14 RX ORDER — TAMSULOSIN HYDROCHLORIDE 0.4 MG/1
0.4 CAPSULE ORAL DAILY
Qty: 90 CAPSULE | Refills: 3 | Status: SHIPPED | OUTPATIENT
Start: 2024-05-14

## 2024-05-14 RX ORDER — ESTRADIOL 0.1 MG/G
CREAM VAGINAL
Qty: 42.5 G | Refills: 1 | Status: SHIPPED | OUTPATIENT
Start: 2024-05-14 | End: 2024-05-14 | Stop reason: SDUPTHER

## 2024-05-14 RX ORDER — ESTRADIOL 0.1 MG/G
CREAM VAGINAL
Qty: 42.5 G | Refills: 1 | Status: SHIPPED | OUTPATIENT
Start: 2024-05-14

## 2024-05-14 NOTE — PROGRESS NOTES
RANDOM  Bladderscan performed in office today:  Patient voided - last void 1 hour 45 minutes ago , PVR - 365 mL   Patient voided 300 mL after PVR.

## 2024-05-14 NOTE — TELEPHONE ENCOUNTER
Writer left Mrs Choudhary a detailed message with the providers response and office number for her to call the office with any questions.

## 2024-05-23 ENCOUNTER — TELEPHONE (OUTPATIENT)
Dept: UROLOGY | Age: 59
End: 2024-05-23

## 2024-05-23 NOTE — TELEPHONE ENCOUNTER
Patient called and said she was advised by a reliable source that she should be taking an over the counter Probiotic along with a \"raw probiotic for vaginal care\" by [employer].   She wanted to address this with us prior to taking it.

## 2024-05-23 NOTE — TELEPHONE ENCOUNTER
A probiotic for gut health is really all that is needed.  I do not recommend any \"raw probiotics for vaginal care\".  She could also just eat yogurt with live an active cultures this will increase her good bacteria in her gut to improve her microbiome

## 2024-05-30 ENCOUNTER — TELEPHONE (OUTPATIENT)
Dept: UROLOGY | Age: 59
End: 2024-05-30

## 2024-05-30 DIAGNOSIS — N39.46 MIXED INCONTINENCE: ICD-10-CM

## 2024-05-30 DIAGNOSIS — R30.0 DYSURIA: Primary | ICD-10-CM

## 2024-05-30 NOTE — TELEPHONE ENCOUNTER
Drop off urine for culture    Make sure she is drinking 64-80 ounces of water every day    Start colace OTC twice per day

## 2024-05-30 NOTE — TELEPHONE ENCOUNTER
I do not think she is doing anything wrong    The medications she is on work for 24 hours so it does not really matter when she takes them    Lets see what her urine shows and see if the stool softener helps before changing her appointment    Overall she really has been doing great

## 2024-05-30 NOTE — TELEPHONE ENCOUNTER
Patient reports she has abdominal pain and pressure for the past 2-3 days.  Patient denies fever, hematuria, or dysuria.  The patient states her abdomen feels \"bloated and tight\" and she does not feel like she is emptying her bladder.  Patient states she is having severe incontinence in the evening and she \"wets through all of her clothes\".  The patient states she usually has a solid, green stool every other day, but she has not gone for 2-3 days.     The patient states she is taking a probiotic.   Patient is eating a high fiber diet, but she is not taking any stool softeners or miralax.

## 2024-05-30 NOTE — TELEPHONE ENCOUNTER
Patient called the office; wanting to know if she should keep her GI appointment in September with Elena Matta?   The GI office in [place] can get her in June 10th, she said she'll take that one if we feel it is necessary.     She takes her urinary medication at about 1pm every day; she was wondering if this could have an impact on her urinary symptoms or bowel movements.     She is wondering; if she is possibly doing anything \"wrong\" in her day to day routine to cause these issues.

## 2024-06-01 ENCOUNTER — HOSPITAL ENCOUNTER (OUTPATIENT)
Age: 59
Discharge: HOME | End: 2024-06-01
Payer: MEDICARE

## 2024-06-01 DIAGNOSIS — N39.46 MIXED INCONTINENCE: ICD-10-CM

## 2024-06-01 DIAGNOSIS — R30.0 DYSURIA: ICD-10-CM

## 2024-06-01 PROCEDURE — 87077 CULTURE AEROBIC IDENTIFY: CPT

## 2024-06-01 PROCEDURE — 87088 URINE BACTERIA CULTURE: CPT

## 2024-06-01 PROCEDURE — 87086 URINE CULTURE/COLONY COUNT: CPT

## 2024-06-01 PROCEDURE — 87186 SC STD MICRODIL/AGAR DIL: CPT

## 2024-06-02 LAB
MICROORGANISM SPEC CULT: ABNORMAL
SERVICE CMNT-IMP: ABNORMAL
SPECIMEN DESCRIPTION: ABNORMAL

## 2024-06-03 ENCOUNTER — TELEPHONE (OUTPATIENT)
Dept: UROLOGY | Age: 59
End: 2024-06-03

## 2024-06-03 RX ORDER — CEFDINIR 300 MG/1
300 CAPSULE ORAL 2 TIMES DAILY
Qty: 14 CAPSULE | Refills: 0 | Status: SHIPPED | OUTPATIENT
Start: 2024-06-03 | End: 2024-06-10

## 2024-06-03 NOTE — TELEPHONE ENCOUNTER
Patient informed of results, provider instructions and new antibiotic.  Patient verbalizes understanding of all information.

## 2024-06-03 NOTE — TELEPHONE ENCOUNTER
UACS is positive for infection. I sent in culture specific omnicef Take this antibiotic until gone. Take this with food and eat yogurt once per day to prevent GI upset. If you develop nausea, vomiting, or fevers call the office or go to the ER. If your urinary symptoms do not improve once completing the antibiotics call our office.

## 2024-06-10 ENCOUNTER — OFFICE VISIT (OUTPATIENT)
Dept: GASTROENTEROLOGY | Age: 59
End: 2024-06-10
Payer: MEDICARE

## 2024-06-10 VITALS
OXYGEN SATURATION: 98 % | BODY MASS INDEX: 21.6 KG/M2 | HEART RATE: 77 BPM | DIASTOLIC BLOOD PRESSURE: 68 MMHG | RESPIRATION RATE: 18 BRPM | SYSTOLIC BLOOD PRESSURE: 114 MMHG | WEIGHT: 107 LBS

## 2024-06-10 DIAGNOSIS — K21.9 CHRONIC GERD: ICD-10-CM

## 2024-06-10 DIAGNOSIS — K59.00 CONSTIPATION, UNSPECIFIED CONSTIPATION TYPE: Primary | ICD-10-CM

## 2024-06-10 PROCEDURE — 99213 OFFICE O/P EST LOW 20 MIN: CPT | Performed by: NURSE PRACTITIONER

## 2024-06-10 ASSESSMENT — ENCOUNTER SYMPTOMS
BLOOD IN STOOL: 0
ANAL BLEEDING: 0
RESPIRATORY NEGATIVE: 1
CONSTIPATION: 1
ABDOMINAL PAIN: 0

## 2024-06-10 NOTE — PROGRESS NOTES
Plan    1. Constipation, unspecified constipation type  - Discussed recommendation to increase fluid, fiber intake and physical activity.   - Miralax powder OTC as labeled, once daily and titrate up or down (to a maximum of 34 g daily) to effect.  - Stool softener (ex. Colace OTC as labeled)     2. Chronic GERD  - Diet controlled.      Spent 20 minutes with the patient with greater than 50 percent of the time was spent on face-to-face time in discussion with the patient regarding diagnostic options/results, treatment options, counseling, and follow-up plan.      Elena Matta, APRN - CNP    *This report was transcribed using voice recognition software. Every effort was made to ensure accuracy; however, inadvertent computerized transcription errors may be present.

## 2024-06-10 NOTE — PATIENT INSTRUCTIONS
SURVEY:    You may be receiving a survey from [facility] regarding your visit today.    You may get this in the mail, through your MyChart, or in your email.     Please complete the survey to enable us to provide the highest quality of care to you and your family.    If you cannot score us a very good (5 Stars) on any question, please call the office to discuss how we could of made your experience exceptional.    Thank you!    MD Elena Plata, APRN-ARIADNA Rice, KEMI Sloan LPN Brenda Boehler, LPN Jena Adams, MA    Phone: [phone]  Fax: [phone]    Office Hours:   M-TH 8-5, F: 8-12

## 2024-06-21 ENCOUNTER — HOSPITAL ENCOUNTER (OUTPATIENT)
Dept: PHYSICAL THERAPY | Age: 59
Setting detail: THERAPIES SERIES
Discharge: HOME | End: 2024-06-21
Payer: MEDICARE

## 2024-06-21 PROCEDURE — 97110 THERAPEUTIC EXERCISES: CPT

## 2024-06-21 PROCEDURE — 97162 PT EVAL MOD COMPLEX 30 MIN: CPT

## 2024-06-21 NOTE — PROGRESS NOTES
stability.    Long Term Goals  Time Frame for Long Term Goals : 6 weeks  Long Term Goal 1: Patient to be independent and compliant with HEP.  Long Term Goal 2: Patient to have improved core and B LE strength >/=4/5 grossly all planes for improved stability with gait.  Long Term Goal 3: Patient to ambulate with lofstrand crutches [id number] with no fatigue or LOB to return to PLOF.        Minutes Tracking:  Time In: 1110  Time Out: 1210  Minutes: 60  Timed Code Treatment Minutes: 59 Minutes      Cat May PT, DPT    6/21/2024  Electronically signed by Cat May PT on 6/21/2024 at 12:35 PM

## 2024-06-21 NOTE — PLAN OF CARE
planes for improved stability with gait.  Long Term Goal 3: Patient to ambulate with lofstrand crutches [id number] with no fatigue or LOB to return to PLOF.     Prognosis  Therapy Prognosis: Good    Treatment Plan   Plan Frequency: 2  Plan weeks: 4  [x] HP/CP      [] Electrical Stim   [x] Therapeutic Exercise      [x] Gait Training  [] Aquatics   [] Ultrasound         [x] Patient Education/HEP   [x] Manual Therapy  [] Traction    [x] Neuro-katarina        [x] Soft Tissue Mobs            [x] Therapeutic Activity  [] Iontophoresis    [] Orthotic casting/fitting      [] Dry Needling  [] Blood Flow Restriction [] Vasopneumatic Compression             Electronically signed by: Cat May PT, DPT    Date: 6/21/2024      ______________________________________ Date: 6/21/2024   Physician Signature

## 2024-06-26 ENCOUNTER — APPOINTMENT (OUTPATIENT)
Dept: PHYSICAL THERAPY | Age: 59
End: 2024-06-26
Payer: MEDICARE

## 2024-07-05 ENCOUNTER — HOSPITAL ENCOUNTER (OUTPATIENT)
Dept: PHYSICAL THERAPY | Age: 59
Setting detail: THERAPIES SERIES
Discharge: HOME | End: 2024-07-05
Payer: MEDICARE

## 2024-07-05 PROCEDURE — 97530 THERAPEUTIC ACTIVITIES: CPT

## 2024-07-05 PROCEDURE — 97110 THERAPEUTIC EXERCISES: CPT

## 2024-07-05 NOTE — PROGRESS NOTES
Phone: [phone]                 [facility]           Fax: [phone]                           Outpatient Physical Therapy                                                                            Daily Note    Patient: David Choudhary : 1965  [facility] #: [id number]   Referring Physician: Delilah Romo APRN -*  Date: 2024       Treatment Diagnosis: Difficulty walking    PT Insurance Information: [employer]na Medicare  Total # of Visits Approved: 12 Per Physician Order  Total # of Visits to Date: 2  No Show: 0  Canceled Appointment: 0    24 Plan of Care/Recert Due    Pre-Treatment Pain:  0/10  Subjective: Patient unable to bring crutches this date, would like to learn pelvic exercises to decrease incontinence concerns.    Exercises:  Exercise 1: HEP: prone on elbows hip flexor stretch, prone and supine glut sets, supine PPT's  Exercise 2: supine PPT, TA activation, LTR 2 x 10  Exercise 3: seated EOB AAROM ankle pumps, LAQ, hip add 2 x 10  Exercise 4: sit to stand x5    Assessment  Assessment: Initiated lumbar, core, and pelvic strengthening and ROM ther ex this date with good tolerance. AA/tactile cues provided with ea exercise due to decreased proprioception. Patient displays poor core and B LE strength with bed mobility this date, educated patient on bed mobility techniques to increase B LE assist.  Printout of HEP provided with visual demonstration and fair+ return demonstration from patient. Continue per tolerance.    Activity Tolerance  Activity Tolerance: Patient tolerated treatment well    Patient Education  Patient Education: HEP  Pt verbalized/demonstrated good understanding:     [x] Yes         [] No, pt required further clarification.       Post Treatment Pain:  0/10      Plan  Plan Frequency: 2  Plan weeks: 4       Goals  (Total # of Visits to Date: 2)      Short Term Goals  Time Frame for Short Term Goals: 3 weeks  Short Term Goal 1: Patient to initiate HEP for improved core

## 2024-07-10 ENCOUNTER — APPOINTMENT (OUTPATIENT)
Dept: PHYSICAL THERAPY | Age: 59
End: 2024-07-10
Payer: MEDICARE

## 2024-07-12 ENCOUNTER — HOSPITAL ENCOUNTER (OUTPATIENT)
Dept: PHYSICAL THERAPY | Age: 59
Setting detail: THERAPIES SERIES
Discharge: HOME | End: 2024-07-12
Payer: MEDICARE

## 2024-07-12 PROCEDURE — 97116 GAIT TRAINING THERAPY: CPT

## 2024-07-12 PROCEDURE — 97110 THERAPEUTIC EXERCISES: CPT

## 2024-07-12 NOTE — PROGRESS NOTES
Phone: [phone]                 [facility]           Fax: [phone]                           Outpatient Physical Therapy                                                                            Daily Note    Patient: David Choudhary : 1965  [facility] #: [id number]   Referring Physician: Delilah Romo APRN -*  Date: 2024    Diagnosis: Cerebral palsy, G80.9  Treatment Diagnosis: Difficulty walking    PT Insurance Information: A[employer]na Medicare  Total # of Visits Approved: 12 Per Physician Order  Total # of Visits to Date: 3  No Show: 0  Canceled Appointment: 0    24 Plan of Care/Recert Due    Pre-Treatment Pain:  0/10  Subjective: Pt brought crutches today. Pt denies pain, still having some incontinence concerns.    Exercises:  Exercise 1: HEP: prone on elbows hip flexor stretch, prone and supine glut sets, supine PPT's  Exercise 2: supine PPT, TA activation, AAROM LTR x10-15  Exercise 3: seated EOB AAROM ankle pumps, LAQ, hip add  x 10  Exercise 4: sit to stand x10  Exercise 5: Supine AAROM SAQ, heel slides, marches x10 ea  Exercise 6: Ambulating with lofstrand crutches 2x20'    Assessment  Assessment: Initiated gait with lofstrand crutches this date with good tolerance, CGA for safety. Pt's gait sequencing pattern varies between step-to and 4-point. Continued to progress core and BLE strengthening this date, pt continued to demonstrate poor core and limited BLE mobility with bed mobility. Will continue to progress per pt tolerance    Activity Tolerance  Activity Tolerance: Patient tolerated treatment well    Patient Education  Patient Education: HEP  Pt verbalized/demonstrated good understanding:     [x] Yes         [] No, pt required further clarification.    Post Treatment Pain:  0/10      Plan  Plan Frequency: 2  Plan weeks: 4       Goals  (Total # of Visits to Date: 3)      Short Term Goals  Time Frame for Short Term Goals: 3 weeks  Short Term Goal 1: Patient to initiate HEP for

## 2024-07-15 ENCOUNTER — APPOINTMENT (OUTPATIENT)
Dept: PHYSICAL THERAPY | Age: 59
End: 2024-07-15
Payer: MEDICARE

## 2024-07-17 ENCOUNTER — HOSPITAL ENCOUNTER (OUTPATIENT)
Dept: PHYSICAL THERAPY | Age: 59
Setting detail: THERAPIES SERIES
Discharge: HOME | End: 2024-07-17
Payer: MEDICARE

## 2024-07-17 PROCEDURE — 97110 THERAPEUTIC EXERCISES: CPT

## 2024-07-17 NOTE — FLOWSHEET NOTE
Phone: [phone]                 [facility]           Fax: [phone]                           Outpatient Physical Therapy                                                                            Daily Note    Patient: David Choudhary : 1965  [facility] #: [id number]   Referring Physician: Delilah Romo APRN -*  Date: 2024    Diagnosis: Cerebral palsy, G80.9  Treatment Diagnosis: Difficulty walking    PT Insurance Information: A[employer]na Medicare  Total # of Visits Approved: 12 Per Physician Order  Total # of Visits to Date: 4  No Show: 0  Canceled Appointment: 0    24 Plan of Care/Recert Due    Pre-Treatment Pain:  0/10  Subjective: Pt arrives in W/C today. No issues reported just fatigue after last appt. Cont to request PF strengthening edu.    Exercises:  Exercise 2: supine PPT, TA activation, AAROM LTR x10-15  Exercise 3: seated EOB AAROM ankle pumps, LAQ, hip add/IR/ER  x 10  Exercise 4: sit to stand x10  Exercise 5: Supine AAROM SAQ, heel slides, marches x10 ea  Exercise 7: PF ex 2\"x10    Assessment  Assessment: Unable to complete gait training this date as pt did not bring AD's. Cont ex per log above with minimal mm engagement, mostly PROM vs AAROM. Edu for kegels with good return demo palpated through clothing at ischial tub with pt consent. Reviewed completing at home and will monitor sx.    Activity Tolerance  Activity Tolerance: Patient tolerated treatment well    Patient Education  Patient Education: HEP  Pt verbalized/demonstrated good understanding:     [x] Yes         [] No, pt required further clarification.       Post Treatment Pain:  0/10      Plan  Plan Frequency: 2  Plan weeks: 4       Goals  (Total # of Visits to Date: 4)      Short Term Goals  Time Frame for Short Term Goals: 3 weeks  Short Term Goal 1: Patient to initiate HEP for improved core and B hip strength.  Short Term Goal 2: Patient to be instructed in gait training with lofstrand crutches to return to

## 2024-07-31 ENCOUNTER — HOSPITAL ENCOUNTER (OUTPATIENT)
Dept: PHYSICAL THERAPY | Age: 59
Setting detail: THERAPIES SERIES
Discharge: HOME | End: 2024-07-31
Payer: MEDICARE

## 2024-07-31 PROCEDURE — 97110 THERAPEUTIC EXERCISES: CPT

## 2024-07-31 NOTE — FLOWSHEET NOTE
Phone: [phone]                 [facility]           Fax: [phone]                           Outpatient Physical Therapy                                                                            Daily Note    Patient: David Choudhary : 1965  CSN #: [id number]   Referring Physician: Delilah Romo APRN -*  Date: 2024    Diagnosis: Cerebral palsy, G80.9  Treatment Diagnosis: Difficulty walking    PT Insurance Information: A[employer]na Medicare  Total # of Visits Approved: 12 Per Physician Order  Total # of Visits to Date: 5  No Show: 0  Canceled Appointment: 0    24 Plan of Care/Recert Due    Pre-Treatment Pain:  0/10  Subjective: Pt arrives with crutches this date, states she been working on ex.    Exercises:  Exercise 2: supine PPT, TA activation, AAROM LTR x10-15 SB bridges, SLR, ROM  Exercise 3: seated EOB AAROM ankle pumps, LAQ, hip add x 10  Exercise 5: Supine AAROM SAQ, heel slides, marches x10 ea  Exercise 6: Ambulating with lofstrand crutches 3x20'  Exercise 7: PF ex 2\"x10 adductor squeeze    Assessment  Assessment: Continued to progress ex per log above. Completed gait training with good tolerance, pt able to amb to waiting area after appt.    Activity Tolerance  Activity Tolerance: Patient tolerated treatment well    Patient Education  Patient Education: HEP  Pt verbalized/demonstrated good understanding:     [x] Yes         [] No, pt required further clarification.       Post Treatment Pain:  0/10      Plan  Plan Frequency: 2  Plan weeks: 4       Goals  (Total # of Visits to Date: 5)      Short Term Goals  Time Frame for Short Term Goals: 3 weeks  Short Term Goal 1: Patient to initiate HEP for improved core and B hip strength.  Short Term Goal 2: Patient to be instructed in gait training with lofstrand crutches to return to PLOF.  Short Term Goal 3: Patient to be instructed in core strengthening to improve lumbopelvic stability.    Long Term Goals  Time Frame for Long Term

## 2024-08-02 NOTE — PLAN OF CARE
Therapeutic Exercise      [x] Gait Training  [] Aquatics   [] Ultrasound         [x] Patient Education/HEP   [x] Manual Therapy  [] Traction    [x] Neuro-katarina        [x] Soft Tissue Mobs            [x] Therapeutic Activity  [] Iontophoresis    [] Orthotic casting/fitting      [] Dry Needling  [] Blood Flow Restriction [] Vasopneumatic Compression             Electronically signed by: Cat May PT, DPT    Date: 7/31/2024      ______________________________________ Date: 7/31/2024   Physician Signature

## 2024-08-08 ENCOUNTER — TELEPHONE (OUTPATIENT)
Dept: UROLOGY | Age: 59
End: 2024-08-08

## 2024-08-08 RX ORDER — CEFDINIR 300 MG/1
300 CAPSULE ORAL 2 TIMES DAILY
Qty: 20 CAPSULE | Refills: 0 | Status: SHIPPED | OUTPATIENT
Start: 2024-08-08 | End: 2024-08-18

## 2024-08-08 NOTE — TELEPHONE ENCOUNTER
Please find out what symptoms patient was having which led to urine culture being obtained.    If patient was not having any new or worsening symptoms we will not treat the positive urine culture    If patient was having worsening symptoms we will treat accordingly.

## 2024-08-08 NOTE — TELEPHONE ENCOUNTER
The patient's primary care provider called this morning; Ms Hernandez has an UTI. She faxed over the results so we could treat accordingly.

## 2024-08-08 NOTE — TELEPHONE ENCOUNTER
Writer called the patient and asked her what new or worsening symptoms she was having. The patient stated she is experiencing back pain, lower abdomen, increase in urination more often, and her urine has a foul smell; she is very bloated as well.

## 2024-08-08 NOTE — TELEPHONE ENCOUNTER
The patient would like to know if she is having an infection due to the same bacteria? Is that able to be determined?     Please advise.

## 2024-08-09 NOTE — TELEPHONE ENCOUNTER
Please let her know her most recent urinary tract infection was caused by Enterobacter    Prior UTI was E. Coli    The one prior to that was Klebsiella    With please let her know that when we treat urinary tract infection today based off of the culture and the sensitivities of what antibiotic will kill that particular bacteria

## 2024-08-14 ENCOUNTER — APPOINTMENT (OUTPATIENT)
Dept: PHYSICAL THERAPY | Age: 59
End: 2024-08-14
Payer: MEDICARE

## 2024-08-21 ENCOUNTER — HOSPITAL ENCOUNTER (OUTPATIENT)
Dept: PHYSICAL THERAPY | Age: 59
Setting detail: THERAPIES SERIES
Discharge: HOME | End: 2024-08-21
Payer: MEDICARE

## 2024-08-21 PROCEDURE — 97110 THERAPEUTIC EXERCISES: CPT

## 2024-08-21 NOTE — FLOWSHEET NOTE
Phone: [phone]                 [facility]           Fax: [phone]                           Outpatient Physical Therapy                                                                            Daily Note    Patient: David Choudhary : 1965  [facility] #: [id number]   Referring Physician: Delilah Romo APRN -*  Date: 2024    Diagnosis: Cerebral palsy, G80.9  Treatment Diagnosis: Difficulty walking    PT Insurance Information: A[employer]na Medicare  Total # of Visits Approved: 18 Per Physician Order  Total # of Visits to Date: 6  No Show: 0  Canceled Appointment: 0    24 Plan of Care/Recert Due    Pre-Treatment Pain:  0/10  Subjective: Pt reports recent onset of bladder infection, is on antibiotics.    Exercises:  Exercise 2: supine PPT, TA activation, AAROM LTR x10-15 SB bridges, SLR, ROM  Exercise 3: seated EOB AAROM ankle pumps, LAQ, hip add x 10  Exercise 5: Supine AAROM SAQ, heel slides, marches x10 ea  Exercise 6: Ambulating with lofstrand crutches 2.5 short laps around clinic, to and from mat table 40'x2, to waiting area 60'x1    Assessment  Assessment: Continued mat ex per log above with good tolerance. Further ambulation completed this date with good tolerance. Step-to gait pattern, some difficulty navigating around corners bobbi with crutches. Fatigue with inc knee pain and difficulty maintaining initial positioning, more medial collapse of knees noted. Improved post-rest break.    Activity Tolerance  Activity Tolerance: Patient tolerated treatment well    Patient Education  Patient Education: HEP  Pt verbalized/demonstrated good understanding:     [x] Yes         [] No, pt required further clarification.       Post Treatment Pain:  0/10      Plan  Plan Frequency: 2  Plan weeks: 8       Goals  (Total # of Visits to Date: 6)      Short Term Goals  Time Frame for Short Term Goals: 3 weeks  Short Term Goal 1: Patient to initiate HEP for improved core and B hip strength.-met  Short

## 2024-09-09 ENCOUNTER — HOSPITAL ENCOUNTER (OUTPATIENT)
Dept: PHYSICAL THERAPY | Age: 59
Setting detail: THERAPIES SERIES
Discharge: HOME | End: 2024-09-09
Payer: MEDICARE

## 2024-09-09 PROCEDURE — 97110 THERAPEUTIC EXERCISES: CPT

## 2024-09-16 ENCOUNTER — TELEPHONE (OUTPATIENT)
Dept: UROLOGY | Age: 59
End: 2024-09-16

## 2024-09-16 DIAGNOSIS — R30.0 DYSURIA: Primary | ICD-10-CM

## 2024-09-18 ENCOUNTER — HOSPITAL ENCOUNTER (OUTPATIENT)
Age: 59
Discharge: HOME | End: 2024-09-18
Payer: MEDICARE

## 2024-09-18 DIAGNOSIS — R30.0 DYSURIA: ICD-10-CM

## 2024-09-18 PROCEDURE — 87086 URINE CULTURE/COLONY COUNT: CPT

## 2024-09-19 ENCOUNTER — OFFICE VISIT (OUTPATIENT)
Dept: GASTROENTEROLOGY | Age: 59
End: 2024-09-19
Payer: MEDICARE

## 2024-09-19 ENCOUNTER — OFFICE VISIT (OUTPATIENT)
Dept: UROLOGY | Age: 59
End: 2024-09-19

## 2024-09-19 ENCOUNTER — TELEPHONE (OUTPATIENT)
Dept: UROLOGY | Age: 59
End: 2024-09-19

## 2024-09-19 VITALS
RESPIRATION RATE: 18 BRPM | SYSTOLIC BLOOD PRESSURE: 106 MMHG | BODY MASS INDEX: 21.6 KG/M2 | DIASTOLIC BLOOD PRESSURE: 69 MMHG | HEART RATE: 79 BPM | WEIGHT: 107 LBS | OXYGEN SATURATION: 99 %

## 2024-09-19 VITALS
SYSTOLIC BLOOD PRESSURE: 124 MMHG | TEMPERATURE: 97.7 F | WEIGHT: 109 LBS | BODY MASS INDEX: 22 KG/M2 | DIASTOLIC BLOOD PRESSURE: 68 MMHG

## 2024-09-19 DIAGNOSIS — G80.9 CEREBRAL PALSY, UNSPECIFIED TYPE (HCC): ICD-10-CM

## 2024-09-19 DIAGNOSIS — R35.0 FREQUENCY OF URINATION: ICD-10-CM

## 2024-09-19 DIAGNOSIS — K59.00 CONSTIPATION, UNSPECIFIED CONSTIPATION TYPE: ICD-10-CM

## 2024-09-19 DIAGNOSIS — N39.46 MIXED INCONTINENCE: ICD-10-CM

## 2024-09-19 DIAGNOSIS — N95.8 GENITOURINARY SYNDROME OF MENOPAUSE: ICD-10-CM

## 2024-09-19 DIAGNOSIS — R63.4 WEIGHT LOSS: ICD-10-CM

## 2024-09-19 DIAGNOSIS — K21.9 CHRONIC GERD: Primary | ICD-10-CM

## 2024-09-19 DIAGNOSIS — K59.09 CHRONIC CONSTIPATION: ICD-10-CM

## 2024-09-19 DIAGNOSIS — N39.0 FREQUENT UTI: Primary | ICD-10-CM

## 2024-09-19 LAB
MICROORGANISM SPEC CULT: NORMAL
SERVICE CMNT-IMP: NORMAL
SPECIMEN DESCRIPTION: NORMAL

## 2024-09-19 PROCEDURE — 99213 OFFICE O/P EST LOW 20 MIN: CPT | Performed by: NURSE PRACTITIONER

## 2024-09-19 RX ORDER — NUT TX, LACT-REDUCED, IRON 0.09G-2.25
273 LIQUID (ML) ORAL 2 TIMES DAILY
Qty: 1620 ML | Refills: 11 | Status: SHIPPED | OUTPATIENT
Start: 2024-09-19

## 2024-09-19 RX ORDER — DOCUSATE SODIUM 100 MG/1
100 CAPSULE, LIQUID FILLED ORAL 2 TIMES DAILY
Qty: 60 CAPSULE | Refills: 4 | Status: SHIPPED | OUTPATIENT
Start: 2024-09-19

## 2024-09-20 ENCOUNTER — TELEPHONE (OUTPATIENT)
Dept: UROLOGY | Age: 59
End: 2024-09-20

## 2024-09-20 RX ORDER — FLUCONAZOLE 150 MG/1
150 TABLET ORAL
Qty: 2 TABLET | Refills: 0 | Status: SHIPPED | OUTPATIENT
Start: 2024-09-20 | End: 2024-09-26

## 2024-09-25 ENCOUNTER — HOSPITAL ENCOUNTER (OUTPATIENT)
Dept: PHYSICAL THERAPY | Age: 59
Setting detail: THERAPIES SERIES
Discharge: HOME | End: 2024-09-25
Payer: MEDICARE

## 2024-09-25 PROCEDURE — 97140 MANUAL THERAPY 1/> REGIONS: CPT

## 2024-09-25 PROCEDURE — 97110 THERAPEUTIC EXERCISES: CPT

## 2024-09-25 PROCEDURE — 97116 GAIT TRAINING THERAPY: CPT

## 2024-11-18 ENCOUNTER — HOSPITAL ENCOUNTER (OUTPATIENT)
Dept: PHYSICAL THERAPY | Age: 59
Setting detail: THERAPIES SERIES
Discharge: HOME | End: 2024-11-18
Payer: MEDICARE

## 2024-11-18 PROCEDURE — 97110 THERAPEUTIC EXERCISES: CPT

## 2024-11-18 NOTE — FLOWSHEET NOTE
Phone: [phone]                 [facility]           Fax: [phone]                           Outpatient Physical Therapy                                                                            Daily Note    Patient: David Choudhary : 1965  [facility] #: [id number]   Referring Physician: Delilah Romo APRN -*  Date: 2024    Diagnosis: Cerebral palsy, G80.9  Treatment Diagnosis: Difficulty walking    PT Insurance Information: A[employer]na Medicare  Total # of Visits Approved: 18 Per Physician Order  Total # of Visits to Date: 9  No Show: 0  Canceled Appointment: 0    24 Plan of Care/Recert Due    Pre-Treatment Pain:  0/10  Subjective: Pt reports mediction has helped with swelling in LE's. She is occasionally completing HEP, is uncomfortable asking her BF for help.    Exercises:  Exercise 2: supine PPT, TA activation, AAROM LTR x10-15 SB bridges, SLR, ROM  Exercise 4: Seated leans x10, ball tosses x10, ball reaches diagonals x10  Exercise 6: Ambulating with lofstrand crutches 2 short laps around clinic to waiting area 60'x1      Assessment  Assessment: Cont mat ex with min-modA as needed, seated balance ex and gait training.    Activity Tolerance  Activity Tolerance: Patient tolerated treatment well    Patient Education  Patient Education: HEP  Pt verbalized/demonstrated good understanding:     [x] Yes         [] No, pt required further clarification.       Post Treatment Pain:  0/10      Plan  Plan Frequency: 2  Plan weeks: 4       Goals  (Total # of Visits to Date: 9)      Short Term Goals  Time Frame for Short Term Goals: 3 weeks  Short Term Goal 1: Patient to initiate HEP for improved core and B hip strength.-met  Short Term Goal 2: Patient to be instructed in gait training with lofstrand crutches to return to PLOF.-met  Short Term Goal 3: Patient to be instructed in core strengthening to improve lumbopelvic stability.-met    Long Term Goals  Time Frame for Long Term Goals : 6

## 2024-12-19 ENCOUNTER — TELEPHONE (OUTPATIENT)
Dept: UROLOGY | Age: 59
End: 2024-12-19

## 2024-12-19 ENCOUNTER — OFFICE VISIT (OUTPATIENT)
Dept: UROLOGY | Age: 59
End: 2024-12-19
Payer: MEDICARE

## 2024-12-19 VITALS
WEIGHT: 113 LBS | BODY MASS INDEX: 22.81 KG/M2 | DIASTOLIC BLOOD PRESSURE: 72 MMHG | SYSTOLIC BLOOD PRESSURE: 115 MMHG | TEMPERATURE: 97.3 F

## 2024-12-19 DIAGNOSIS — N39.46 MIXED INCONTINENCE: Primary | ICD-10-CM

## 2024-12-19 DIAGNOSIS — G80.9 CEREBRAL PALSY, UNSPECIFIED TYPE (HCC): ICD-10-CM

## 2024-12-19 DIAGNOSIS — K59.09 CHRONIC CONSTIPATION: ICD-10-CM

## 2024-12-19 DIAGNOSIS — N95.8 GENITOURINARY SYNDROME OF MENOPAUSE: ICD-10-CM

## 2024-12-19 DIAGNOSIS — N39.0 FREQUENT UTI: ICD-10-CM

## 2024-12-19 PROCEDURE — 99214 OFFICE O/P EST MOD 30 MIN: CPT | Performed by: NURSE PRACTITIONER

## 2024-12-19 PROCEDURE — 51798 US URINE CAPACITY MEASURE: CPT | Performed by: NURSE PRACTITIONER

## 2024-12-19 RX ORDER — MIRABEGRON 50 MG/1
50 TABLET, FILM COATED, EXTENDED RELEASE ORAL DAILY
Qty: 30 TABLET | Refills: 2 | Status: SHIPPED | OUTPATIENT
Start: 2024-12-19

## 2024-12-19 NOTE — PROGRESS NOTES
Random bladderscan performed in office today: patient notes that she went to the bathroom earlier today.  She \"does not practice the voiding every two hour thing\"  scan = 244 mL , then voided apx 150ml  
Flomax and trospium daily    Continue timed voiding every 2-3 hours while awake    Drink at least 64 ounces of water every day    Today  Here today to follow-up for incontinence, incomplete bladder emptying, constipation and frequent urinary tract infections.  She has not had a UTI since August.  She states she was using vaginal estrogen 3 nights per week.  She has struggled with significant constipation.  She is now taking Colace twice per day and fiber daily.  She is having a bowel movement at least every other day. She emptied her bladder well today. She is not practicing time voids. She does complain of urge incontinence. She is no longer having bladder pain or abdominal pain.    Plan  Continue flomax    Continue trospium    Start myrbetriq    Continue colace and fiber    Continue estradiol    Drink 64 ounces of water every day    F/U in 3 months

## 2025-03-21 ENCOUNTER — HOSPITAL ENCOUNTER (OUTPATIENT)
Age: 60
Discharge: HOME | End: 2025-03-21
Payer: MEDICARE

## 2025-03-21 ENCOUNTER — RESULTS FOLLOW-UP (OUTPATIENT)
Dept: UROLOGY | Age: 60
End: 2025-03-21

## 2025-03-21 ENCOUNTER — OFFICE VISIT (OUTPATIENT)
Dept: UROLOGY | Age: 60
End: 2025-03-21

## 2025-03-21 VITALS
SYSTOLIC BLOOD PRESSURE: 120 MMHG | TEMPERATURE: 96.9 F | BODY MASS INDEX: 23.62 KG/M2 | DIASTOLIC BLOOD PRESSURE: 80 MMHG | WEIGHT: 117 LBS

## 2025-03-21 DIAGNOSIS — R33.9 INCOMPLETE BLADDER EMPTYING: ICD-10-CM

## 2025-03-21 DIAGNOSIS — N39.46 MIXED INCONTINENCE: Primary | ICD-10-CM

## 2025-03-21 DIAGNOSIS — G80.9 CEREBRAL PALSY, UNSPECIFIED TYPE (HCC): ICD-10-CM

## 2025-03-21 DIAGNOSIS — K59.09 CHRONIC CONSTIPATION: ICD-10-CM

## 2025-03-21 DIAGNOSIS — N95.8 GENITOURINARY SYNDROME OF MENOPAUSE: ICD-10-CM

## 2025-03-21 DIAGNOSIS — N39.46 MIXED INCONTINENCE: ICD-10-CM

## 2025-03-21 LAB
ANION GAP SERPL CALCULATED.3IONS-SCNC: 11 MMOL/L (ref 9–16)
BUN SERPL-MCNC: 15 MG/DL (ref 6–20)
BUN/CREAT SERPL: 21 (ref 9–20)
CALCIUM SERPL-MCNC: 9.4 MG/DL (ref 8.6–10.4)
CHLORIDE SERPL-SCNC: 102 MMOL/L (ref 98–107)
CO2 SERPL-SCNC: 25 MMOL/L (ref 20–31)
CREAT SERPL-MCNC: 0.7 MG/DL (ref 0.5–0.9)
GFR, ESTIMATED: >90 ML/MIN/1.73M2
GLUCOSE SERPL-MCNC: 85 MG/DL (ref 74–99)
POTASSIUM SERPL-SCNC: 3.6 MMOL/L (ref 3.7–5.3)
SODIUM SERPL-SCNC: 138 MMOL/L (ref 136–145)

## 2025-03-21 PROCEDURE — 80048 BASIC METABOLIC PNL TOTAL CA: CPT

## 2025-03-21 PROCEDURE — 36415 COLL VENOUS BLD VENIPUNCTURE: CPT

## 2025-03-21 RX ORDER — TAMSULOSIN HYDROCHLORIDE 0.4 MG/1
0.4 CAPSULE ORAL DAILY
Qty: 90 CAPSULE | Refills: 3 | Status: SHIPPED | OUTPATIENT
Start: 2025-03-21

## 2025-03-21 RX ORDER — MIRABEGRON 50 MG/1
50 TABLET, FILM COATED, EXTENDED RELEASE ORAL DAILY
Qty: 90 TABLET | Refills: 3 | Status: SHIPPED | OUTPATIENT
Start: 2025-03-21

## 2025-03-21 RX ORDER — ESTRADIOL 0.1 MG/G
CREAM VAGINAL
Qty: 42.5 G | Refills: 3 | Status: SHIPPED | OUTPATIENT
Start: 2025-03-21

## 2025-03-21 RX ORDER — TROSPIUM CHLORIDE ER 60 MG/1
60 CAPSULE ORAL DAILY
Qty: 90 CAPSULE | Refills: 3 | Status: SHIPPED | OUTPATIENT
Start: 2025-03-21

## 2025-03-21 NOTE — PROGRESS NOTES
Random bladderscan performed in office today:  Pt last voided 60 mins ago, scan = 570 mL   
Flomax and trospium daily    Continue timed voiding every 2-3 hours while awake    Drink at least 64 ounces of water every day    12/2024 follow-up for incontinence, incomplete bladder emptying, constipation and frequent urinary tract infections.  She has not had a UTI since August.  She states she was using vaginal estrogen 3 nights per week.  She has struggled with significant constipation.  She is now taking Colace twice per day and fiber daily.  She is having a bowel movement at least every other day. She emptied her bladder well today. She is not practicing time voids. She does complain of urge incontinence. She is no longer having bladder pain or abdominal pain.  Continue flomax    Continue trospium    Start myrbetriq    Continue colace and fiber    Continue estradiol    Drink 64 ounces of water every day    Today  Here today to follow-up for incontinence, incomplete bladder emptying, constipation, and frequent urinary tract infections.  She has not had any urinary tract infections since August! She is using vaginal estrogen 3 nights per week.  She is having a bowel movement every 1-2 days with Colace and fiber daily.  She is unable to urinate in the office.  Renal bladder scan is 570 mL.  She is taking trospium daily and Myrbetriq daily.  She is urinating every 3 hours during the day and denies any daytime incontinence.  She does have nighttime incontinence.  Her brief is wet, but no longer saturated.  She is no longer having abdominal pain!    Plan  Patient's urinary symptoms have significantly improved and she does have improved quality of life    Due to incomplete bladder emptying we will check renal labs    We will send hats home with her and have her void at home and call us with her voided amount    Follow-up pending labs and voided amount at home

## 2025-03-24 NOTE — TELEPHONE ENCOUNTER
----- Message from TOMER Borja - CNP sent at 3/21/2025 12:03 PM EDT -----  Renal labs are normal. See how much she urinated at home today.

## 2025-03-24 NOTE — TELEPHONE ENCOUNTER
Patient called to follow up on her urine output from Friday.      This nurse advised the patient that the provider stated the urine output was acceptable.  Patient scheduled 6 month follow up for 09/23/2025 per provider's instructions.

## 2025-03-25 ENCOUNTER — OFFICE VISIT (OUTPATIENT)
Dept: GASTROENTEROLOGY | Age: 60
End: 2025-03-25
Payer: MEDICARE

## 2025-03-25 VITALS
BODY MASS INDEX: 23.62 KG/M2 | SYSTOLIC BLOOD PRESSURE: 109 MMHG | DIASTOLIC BLOOD PRESSURE: 72 MMHG | HEART RATE: 78 BPM | RESPIRATION RATE: 18 BRPM | OXYGEN SATURATION: 96 % | WEIGHT: 117 LBS

## 2025-03-25 DIAGNOSIS — K59.00 CONSTIPATION, UNSPECIFIED CONSTIPATION TYPE: ICD-10-CM

## 2025-03-25 DIAGNOSIS — K21.9 CHRONIC GERD: Primary | ICD-10-CM

## 2025-03-25 PROCEDURE — 99213 OFFICE O/P EST LOW 20 MIN: CPT | Performed by: NURSE PRACTITIONER

## 2025-03-25 ASSESSMENT — ENCOUNTER SYMPTOMS: TROUBLE SWALLOWING: 0

## 2025-03-25 NOTE — PROGRESS NOTES
mucosa with mild chronic inflammation.  Part D/C: Distal and proximal esophagus, biopsy: Benign esophageal epithelium.  Part of colon terminal ileum, biopsy: Benign small bowel, negative for acute inflammation.  Part G/H: Right and left colon, multiple biopsies: Benign colonic mucosa, negative for acute colitis.  Part I: Rectum, polypectomy: Hyperplastic polyp      Assessment  David Choudhary is a 60 y.o. old female who has a past medical history of cerebral palsy, H. pylori infection (negative retest s/p tx on 08/07/2023), chronic constipation presents today for follow up constipation and GERD.  According to David, she is doing well, feeling well.  David voices that her constipation has improved and continues with her bowel regimen of daily Benefiber and 2 stool softeners a day. Trending weights stable. David voices that since her symptoms seem to be well controlled, she is planning to restart physical therapy and crutches.  Return in about 6 months (around 9/25/2025). Or sooner for concerns.      Plan    1. Chronic GERD  - Discussed over the counter PPI and or TUMs  - Advised on the correct dose and timing of the PPI, 20-30 min before breakfast   - Pathophysiology and etiology of reflux discussed  - Lifestyle modification recommended and discussed with the patient   - Avoid NSAID use (examples provided)   - Avoid fatty, spicy, acidic based, carbonated foods   - Limit coffee, tea, alcohol use   - Avoid meals 3 to 4 hrs before lying down   - Elevate the head of the bed    - Keep healthy weight    2. Constipation, unspecified constipation type  - Discussed recommendation to increase fluid, fiber intake and physical activity.   - Continue current bowel regimen.    Spent 15 minutes with the patient with greater than 50 percent of the time was spent on face-to-face time in discussion with the patient regarding diagnostic options/results, treatment options, counseling, and follow-up plan.      TOMER Blount -

## 2025-03-25 NOTE — PATIENT INSTRUCTIONS
SURVEY:    You may be receiving a survey from [facility].    You may get this in the mail, through your MyChart, or in your email.     Please complete the survey to enable us to provide the highest quality of care to you and your family.    If you cannot score us a very good (5 Stars) on any question, please call the office to discuss how we could of made your experience exceptional.    Thank you!    General Surgery    MD Dr. Nemo Quiros, DO  Dr. Puneet Quigley, DO  Elena Matta, TOMER-CNP    Pain Mgmt.  Dr. Gunnar Bautista, DO  ARIADNA Raygoza, KEMI Ricks LPN Jena Adams, MA Emily Akers, MA    Phone: [phone]  Fax: [phone]    Office Hours:   M-TH 8-5, F: 8-12

## 2025-03-26 ASSESSMENT — ENCOUNTER SYMPTOMS
RESPIRATORY NEGATIVE: 1
ABDOMINAL PAIN: 0
BLOOD IN STOOL: 0
NAUSEA: 0
CONSTIPATION: 1
ANAL BLEEDING: 0
VOMITING: 0

## 2025-05-27 ENCOUNTER — HOSPITAL ENCOUNTER (OUTPATIENT)
Dept: PHYSICAL THERAPY | Age: 60
Setting detail: THERAPIES SERIES
Discharge: HOME | End: 2025-05-27
Payer: MEDICARE

## 2025-05-27 PROCEDURE — 97110 THERAPEUTIC EXERCISES: CPT

## 2025-05-27 PROCEDURE — 97162 PT EVAL MOD COMPLEX 30 MIN: CPT

## 2025-05-29 NOTE — THERAPY EVALUATION
Phone: [phone]                       [facility]          Fax: [phone]                      Outpatient Physical Therapy                                                                      Evaluation  Date: 2025  Patient: David Choudhary  : 1965  CSN #: [id number]    Referring Physician: Delilah Romo APRN -*    Medical Diagnosis: Cerebral Palsy, unspecified G80.9    Treatment Diagnosis: Incontinence  Onset Date: 25 (several years and progressively worsening)  PT Insurance Information: [employer] Medicare  Total # of Visits Approved: 4   Total # of Visits to Date: 1  No Show: 0  Canceled Appointment: 0    [x] This writer acknowledges review of patient history form     Subjective  Subjective: Pt reports being incontinent for at least 3 years and it keeps getting worse to the point where she is sitting in her w/c and not walking to avoid embarassment. She states normally she walks majority of the time and uses the chair minimally which now is reverse at this time. She states she had received treatment from urology for a period of time then with PT and she felt that PT helped too which is why she requested to resume now. Due to financial reasons she requested less frequency and every other week. She does state she has to wear a depends through the day and she wears a full brief at night. She states that since being on the med, it has helped though she is still incontinent.  Additional Pertinent Hx: CP, arthritis    Observations:   Pt has high tone while sitting in w/c with roddy LEs almost fully extended at rest.     Palpation:   Tenderness noted with palpation of roddy LEs, high tone    Ambulation/Gait (if applicable):   Pt states she restricts ambulation due to incontinence     Objective    AROM    Tone restricts motion but functional         Strength    Coordination of muscle contraction is difficult for intentional movement and hypertonic posturing through core and roddy LEs.

## 2025-05-29 NOTE — PLAN OF CARE
[facility]           Phone: [phone]             Outpatient Physical Therapy  Fax: [phone]                                           Date: 2025  Patient: David Choudhary : 1965 [facility] #: [id number]   Referring Physician: Delilah Romo APRN -*      [x] Plan of Care   [] Updated Plan of Care    Dates of Service to Include: 2025 to 25    Diagnosis:  Cerebral Palsy, unspecified G80.9     Rehab (Treatment) Diagnosis:  Incontinence         Onset Date:  25 (several years and progressively worsening)    Attendance  Total # of Visits to Date: 1 No Show: 0 Canceled Appointment: 0    Assessment  Body Structures, Functions, Activity Limitations Requiring Skilled Therapeutic Intervention: Decreased functional mobility , Decreased strength, Increased pain, Decreased posture, Decreased endurance, Decreased balance  Assessment: Pt was referred with a diagnosis of cerebral palsy and complaints of incontinence. Pt has had on-going issues and the incontinence is progressively worsening. Pt is spastic with high tone posture. Pt has decreased flexibility overall and coordination of mm contraction. Progress patient as tolerated. Pt has bladder diary for possible educational opportunities. Will check when patient returns.      Goals  Short Term Goals  Time Frame for Short Term Goals: 3 visits  Short Term Goal 1: Patient will complete and present to PT a 72 hour bladder/bowel diary for analysis and establishment of appropriate intervention  for remaining visits  Short Term Goal 2: Pt will be able to proper diaphragmatic breathing techniques to assist with relaxation and increase awareness of PFM.  Short Term Goal 3: Patient will be further educated with urge suppression techniques, bladder irritant elimination in order to decrease bothersome urinary urge/urge incontinence episodes.  Long Term Goals  Time

## 2025-06-04 ENCOUNTER — APPOINTMENT (OUTPATIENT)
Dept: PHYSICAL THERAPY | Age: 60
End: 2025-06-04
Payer: MEDICARE

## 2025-06-11 ENCOUNTER — HOSPITAL ENCOUNTER (OUTPATIENT)
Dept: PHYSICAL THERAPY | Age: 60
Setting detail: THERAPIES SERIES
Discharge: HOME | End: 2025-06-11
Payer: MEDICARE

## 2025-06-11 PROCEDURE — 97530 THERAPEUTIC ACTIVITIES: CPT

## 2025-06-11 NOTE — PROGRESS NOTES
clarification.       Post Treatment Pain:  0/10      Plan  Plan Frequency: 1x every other week  Plan weeks: 4 weeks       Goals  (Total # of Visits to Date: 2)      Short Term Goals  Time Frame for Short Term Goals: 3 visits  Short Term Goal 1: Patient will complete and present to PT a 72 hour bladder/bowel diary for analysis and establishment of appropriate intervention  for remaining visits - MET but forgot at home  Short Term Goal 2: Pt will be able to proper diaphragmatic breathing techniques to assist with relaxation and increase awareness of PFM. - NOT MET  Short Term Goal 3: Patient will be further educated with urge suppression techniques, bladder irritant elimination in order to decrease bothersome urinary urge/urge incontinence episodes. - NOT MET         Minutes Tracking:  Time In: 1115  Time Out: 1200  Minutes: 45       Nanette Dunlap PTA     Date: 6/11/2025

## 2025-06-18 ENCOUNTER — APPOINTMENT (OUTPATIENT)
Dept: PHYSICAL THERAPY | Age: 60
End: 2025-06-18
Payer: MEDICARE

## 2025-06-25 ENCOUNTER — HOSPITAL ENCOUNTER (OUTPATIENT)
Dept: PHYSICAL THERAPY | Age: 60
Setting detail: THERAPIES SERIES
Discharge: HOME | End: 2025-06-25
Payer: MEDICARE

## 2025-06-25 PROCEDURE — 97530 THERAPEUTIC ACTIVITIES: CPT

## 2025-06-25 NOTE — PROGRESS NOTES
Phone: [phone]                 [facility]           Fax: [phone]                           Outpatient Physical Therapy                                                                            Daily Note    Patient: David Choudhary : 1965  [facility] #: [id number]   Referring Physician: Delilah Romo APRN -*  Date: 2025    Diagnosis: Cerebral Palsy, unspecified G80.9  Treatment Diagnosis: Incontinence    Onset Date: 25  PT Insurance Information: [employer] Medicare  Total # of Visits Approved: 4 Per Physician Order  Total # of Visits to Date: 3  No Show: 0  Canceled Appointment: 0    25 Plan of Care/Recert Due    Pre-Treatment Pain:  0/10  Subjective: Pt reports she was able to insert her sensor successfully at home, but it was painful to remove.    Assessment  Body Structures, Functions, Activity Limitations Requiring Skilled Therapeutic Intervention: Decreased functional mobility , Decreased strength, Increased pain, Decreased posture, Decreased endurance, Decreased balance  Assessment: Completed biofeedback utilizing internal vaginal sensor self-placed per patient. Pt demonstrates high pelvic resting tone ~5mV. Minimal power in PF contraction, fair endurance 2-3\" before loss of motor recruitment. Able to improve pelvic muscle tone and recruitment with bianca graphic and cues for breathing. Pt to continue working on PF and breathing co-ordination, and will continue progressing strengthening via biofeedback at next appointment. Pt reported with utilizing breathing technique to relax pelvic floor, that removing sensor was not painful.     Activity Tolerance  Activity Tolerance: Patient tolerated treatment well    Patient Education  Patient Education: discussed PT POC and completion of bladder diary  Pt verbalized/demonstrated good understanding:     [x] Yes         [] No, pt required further clarification.       Post Treatment Pain:  0/10      Plan  Plan Frequency: 1x every other

## 2025-07-09 ENCOUNTER — HOSPITAL ENCOUNTER (OUTPATIENT)
Dept: PHYSICAL THERAPY | Age: 60
Setting detail: THERAPIES SERIES
Discharge: HOME | End: 2025-07-09
Payer: MEDICARE

## 2025-07-09 PROCEDURE — 97110 THERAPEUTIC EXERCISES: CPT

## 2025-07-09 PROCEDURE — 97530 THERAPEUTIC ACTIVITIES: CPT

## 2025-07-09 NOTE — FLOWSHEET NOTE
Phone: [phone]                 [facility]           Fax: [phone]                           Outpatient Physical Therapy                                                                            Daily Note    Patient: David Choudhary : 1965  [facility] #: [id number]   Referring Physician: Delilah Romo APRN -*  Date: 2025    Diagnosis: Cerebral Palsy, unspecified G80.9  Treatment Diagnosis: Incontinence    Onset Date: 25  PT Insurance Information: [employer] Medicare  Total # of Visits Approved: 4 Per Physician Order  Total # of Visits to Date: 4  No Show: 0  Canceled Appointment: 0    25 Plan of Care/Recert Due    Pre-Treatment Pain:  0/10  Subjective: Pt states on a good day voids bladder 100-500 mL 4-5x per day, other days goes less with less urgency. Has been practicing inhale with kegel accidentally. Drinks 4-5 bottles of water per day and is able to make it to the restroom during the day in time. But in the evenings she is not able to make it to the restroom in time and continues to have leakage.    Exercises:  Exercise 1: Diaphragmatic breathing x10  Exercise 2: Ta brace with exhale x10  Exercise 3: PF brace with exhale x10    Assessment  Body Structures, Functions, Activity Limitations Requiring Skilled Therapeutic Intervention: Decreased functional mobility , Decreased strength, Increased pain, Decreased posture, Decreased endurance, Decreased balance  Assessment: Completed supine diapragmatic breathing and pelvic floor ex with emphasis on improving PF co-ordination. Will continue to progress as tolerable.    Activity Tolerance  Activity Tolerance: Patient tolerated treatment well    Patient Education  Patient Education: [id number]  Pt verbalized/demonstrated good understanding:     [x] Yes         [] No, pt required further clarification.       Post Treatment Pain:  0/10      Plan  Plan Frequency: 1x every other week  Plan weeks: 4 weeks       Goals  (Total # of Visits to

## 2025-07-23 ENCOUNTER — HOSPITAL ENCOUNTER (OUTPATIENT)
Dept: PHYSICAL THERAPY | Age: 60
Setting detail: THERAPIES SERIES
Discharge: HOME | End: 2025-07-23
Payer: MEDICARE

## 2025-07-23 PROCEDURE — 97530 THERAPEUTIC ACTIVITIES: CPT

## 2025-07-23 PROCEDURE — 97110 THERAPEUTIC EXERCISES: CPT

## 2025-07-23 NOTE — FLOWSHEET NOTE
Phone: [phone]                 [facility]           Fax: [phone]                           Outpatient Physical Therapy                                                                            Daily Note    Patient: David Choudhary : 1965  CSN #: [id number]   Referring Physician: Delilah Romo APRN -*  Date: 2025    Diagnosis: Cerebral Palsy, unspecified G80.9  Treatment Diagnosis: Incontinence    Onset Date: 25  PT Insurance Information: [employer] Medicare  Total # of Visits Approved: 4   Total # of Visits to Date: 5  No Show: 0  Canceled Appointment: 0    10/07/25 Plan of Care/Recert Due    Pre-Treatment Pain:  0/10  Subjective: Pt reports she can drink 4-5 bottles of water and does not leak immediatley but eventually does. For the most part she is OK during the day. Leaked as she walked yesterday, once she is trying to get to restroom but notes urine did not run down her legs. Has been practicing urge deference and she was able to make it home from a shopping trip but one she saw her house lost control. Is practicing pelvic floor ex mainly when urgency occurs.    Exercises:  Exercise 3: PF brace with exhale x10  Exercise 4: PF brace with bending forward in wheelchair (simulate picking items up off of floor)    Assessment  Body Structures, Functions, Activity Limitations Requiring Skilled Therapeutic Intervention: Decreased functional mobility , Decreased strength, Increased pain, Decreased posture, Decreased endurance, Decreased balance  Assessment: Reviewed pelvic co-ordination with breathing and pt verbalizes completing correctly. Initiated progressions to functional pelvic floor ex, utilizing PF bracing while bending forward in wheelchair. Pt became emotional and overwhelmed with task, frustrated with being asked to focus on pelvic bracing during functional daily tasks. Redirecting to task multiple times as pt becomes emotional therapy might not be effective for her and